# Patient Record
Sex: MALE | Race: WHITE | NOT HISPANIC OR LATINO | Employment: FULL TIME | URBAN - METROPOLITAN AREA
[De-identification: names, ages, dates, MRNs, and addresses within clinical notes are randomized per-mention and may not be internally consistent; named-entity substitution may affect disease eponyms.]

---

## 2017-05-01 ENCOUNTER — OFFICE VISIT (OUTPATIENT)
Dept: INTERNAL MEDICINE | Facility: CLINIC | Age: 27
End: 2017-05-01

## 2017-05-01 VITALS
BODY MASS INDEX: 19.85 KG/M2 | RESPIRATION RATE: 20 BRPM | DIASTOLIC BLOOD PRESSURE: 68 MMHG | WEIGHT: 140.2 LBS | HEART RATE: 74 BPM | SYSTOLIC BLOOD PRESSURE: 123 MMHG

## 2017-05-01 DIAGNOSIS — Z00.00 ROUTINE GENERAL MEDICAL EXAMINATION AT A HEALTH CARE FACILITY: Primary | ICD-10-CM

## 2017-05-01 DIAGNOSIS — Z71.9 HEALTH EDUCATION/COUNSELING: ICD-10-CM

## 2017-05-01 DIAGNOSIS — Z71.3 DIETARY COUNSELING AND SURVEILLANCE: ICD-10-CM

## 2017-05-01 ASSESSMENT — PAIN SCALES - GENERAL: PAINLEVEL: NO PAIN (0)

## 2017-05-01 NOTE — MR AVS SNAPSHOT
After Visit Summary   5/1/2017    Oziel Vann    MRN: [id number]           Patient Information     Date Of Birth          1990        Visit Information        Provider Department      5/1/2017 7:30 AM Dorota Hamm MD [facility]        Care Instructions    Primary Care Center Medication Refill Request Information:  * Please contact your pharmacy regarding ANY request for medication refills.  ** [facility] Prescription Fax = [phone]  * Please allow 3 business days for routine medication refills.  * Please allow 5 business days for controlled substance medication refills.     Primary Care Center Test Result notification information:  *You will be notified with in 7-10 days of your appointment day regarding the results of your test.  If you are on MyChart you will be notified as soon as the provider has reviewed the results and signed off on them.    Primary Care Center [phone]         Consider taking additional supplements for your health and bones:    Vitamin B12--or multivitamin   Calcium/D supplement    Calcium Rich Non-Dairy Foods:  Oranges  Bok Lucrecia  Watercress  Helene Greens  Brocolli  Kale  Edamame  Figs  [place]  Sardines  White Beans  Tofu  Almonds        Follow-ups after your visit        Follow-up notes from your care team     Return in about 1 year (around 5/1/2018), or if symptoms worsen or fail to improve, for Routine Visit.      Who to contact     Please call your clinic at [phone] to:    Ask questions about your health    Make or cancel appointments    Discuss your medicines    Learn about your test results    Speak to your doctor   If you have compliments or concerns about an experience at your clinic, or if you wish to file a complaint, please contact [facility] Patient Relations at [phone] or email us at [email or web address]         Additional Information About Your Visit        MyChart Information      Boom.fm is an electronic gateway that provides easy, online access to your medical records. With Boom.fm, you can request a clinic appointment, read your test results, renew a prescription or communicate with your care team.     To sign up for Boom.fm visit the website at [email or web address]   You will be asked to enter the access code listed below, as well as some personal information. Please follow the directions to create your username and password.     Your access code is: [email or web address]  Expires: 2017  8:06 AM     Your access code will  in 90 days. If you need help or a new code, please contact your [facility] or call [phone] for assistance.        Care EveryWhere ID     This is your Care EveryWhere ID. This could be used by other organizations to access your [place] medical records  [id number]        Your Vitals Were     Pulse Respirations BMI (Body Mass Index)             74 20 19.85 kg/m2          Blood Pressure from Last 3 Encounters:   17 123/68   16 120/68   16 123/74    Weight from Last 3 Encounters:   17 63.6 kg (140 lb 3.2 oz)   16 66.1 kg (145 lb 12.8 oz)   16 63.9 kg (140 lb 12.8 oz)              Today, you had the following     No orders found for display         Today's Medication Changes          These changes are accurate as of: 17  8:06 AM.  If you have any questions, ask your nurse or doctor.               Stop taking these medicines if you haven't already. Please contact your care team if you have questions.     azithromycin 250 MG tablet   Commonly known as:  ZITHROMAX   Stopped by:  Dorota Hamm MD           fexofenadine-pseudoePHEDrine 180-240 MG per 24 hr tablet   Commonly known as:  ALLEGRA-D 24   Stopped by:  Dorota Hamm MD           fluticasone 50 MCG/ACT spray   Commonly known as:  FLONASE   Stopped by:  Dorota Hamm MD           IBUPROFEN PO   Stopped by:   Dorota Hamm MD                    Primary Care Provider    None Specified       No primary provider on file.        Thank you!     Thank you for choosing [facility]  for your care. Our goal is always to provide you with excellent care. Hearing back from our patients is one way we can continue to improve our services. Please take a few minutes to complete the written survey that you may receive in the mail after your visit with us. Thank you!             Your Updated Medication List - Protect others around you: Learn how to safely use, store and throw away your medicines at www.disposemymeds.org.      Notice  As of 5/1/2017  8:06 AM    You have not been prescribed any medications.

## 2017-05-01 NOTE — NURSING NOTE
Chief Complaint   Patient presents with     Establish Care     patient would like to discuss vegan diet and health      YINA DANG at 7:24 AM on 5/1/2017.

## 2017-05-01 NOTE — PATIENT INSTRUCTIONS
[facility] Medication Refill Request Information:  * Please contact your pharmacy regarding ANY request for medication refills.  ** [facility] Prescription Fax = [phone]  * Please allow 3 business days for routine medication refills.  * Please allow 5 business days for controlled substance medication refills.     [facility] Test Result notification information:  *You will be notified with in 7-10 days of your appointment day regarding the results of your test.  If you are on MyChart you will be notified as soon as the provider has reviewed the results and signed off on them.    [facility] [phone]         Consider taking additional supplements for your health and bones:    Vitamin B12--or multivitamin   Calcium/D supplement    Calcium Rich Non-Dairy Foods:  Oranges  Bok Lucrecia  Watercress  Helene Greens  Brocolli  Kale  Edamame  Figs  [place]  Sardines  White Beans  Tofu  Almonds

## 2017-05-01 NOTE — PROGRESS NOTES
Mr. Vann is a 27 year old male here for physical.    History of Present Illness:  Oziel is a pleasant 28 yo  here for routine physical.  He is generally healthy without acute concerns.  He changed to vegetarian diet 1/1/17. Eats a little bit of fish and cheese.  No eggs.  He feels like energy may have been a bit lower during tax season, now improving.  He was training or a marathon and had a bit of hematuria which never recurred, once had hematochezia but this went away.  Was constipated at time.  No derm, neuro, psych, cardiovascular symptoms.    He does have a gym membership, bikes, runs, goes to [employer].    We reviewed the following health maintenance topics:  Immunizations  Cancer Screenings (Pap, mammogram, colonoscopy, prostate, skin, lung)  Lipids, STDs, HIV/Hep C screening if applicable  AAA screening, Dexa screening if applicable  Lifestyle factors/Saftey (diet, exercise, weight, stress, seat belts, sun protection)  Risk behaviors (tobacco, alcohol, illicit substances, abuse/violence, depression)  Advanced directive planning if applicable    A full 10-pt Review of Systems was performed, verified and is negative except as documented in the HPI.  All health questionnaires were reviewed, verified and relevant information documented above.    Past Medical History:  Past Medical History:   Diagnosis Date     Closed fracture of unspecified part of humerus 03/2005    Left distal Humerus fx.     MEDICAL HISTORY OF - 1995    Fracture of the distal right radius.     MEDICAL HISTORY OF - 1999    Tibia fracture of right leg       Past Surgical History:  Past Surgical History:   Procedure Laterality Date     SURGICAL HISTORY OF -   03/2005    ORIF Left Humerus Fx.  [facility]           Active Meds:  No current outpatient prescriptions on file.     No current facility-administered medications for this visit.         Allergies:  Cefaclor and Seasonal allergies    Family  History:  family history includes Coronary Artery Disease in his maternal grandfather. There is no history of DIABETES, Hypertension, Hyperlipidemia, Breast Cancer, Colon Cancer, Prostate Cancer, Thyroid Disease, Asthma, Genetic Disorder, Depression, or Anxiety Disorder.    Social History:  Social History   Substance Use Topics     Smoking status: Never Smoker     Smokeless tobacco: Never Used     Alcohol use Yes      Comment: 1-2 beers every 1-2 weeks       Physical Exam:  Vitals: /68  Pulse 74  Resp 20  Wt 63.6 kg (140 lb 3.2 oz)  BMI 19.85 kg/m2  Constitutional: Alert, oriented, pleasant, no acute distress, thin  Head: Normocephalic, atraumatic  Eyes: Extra-ocular movements intact, pupils equally round and reactive bilaterally, no scleral icterus  ENT: Oropharynx clear, moist mucus membranes, good dentition  Neck: Supple, no lymphadenopathy, no thyromegaly  Cardiovascular: Regular rate and rhythm, no murmurs, rubs or gallops, peripheral pulses full/symmetric  Respiratory: Good air movement bilaterally, lungs clear, no wheezes/rales/rhonchi  GI: Abdomen soft, bowel sounds present, nondistended, nontender, no organomegaly or masses, no rebound/guarding  Musculoskeletal: No edema, normal muscle tone, normal gait  Neurologic: Alert and oriented, cranial nerves 2-12 intact, nonfocal  Skin: No rashes/lesions, fair skin, no concerning lesions  Psychiatric: normal mentation, affect and mood          Assessment and Plan:    Oziel was seen today for establish care.  Reviewed mood, substance use, sunscreen, safety, exercise, weight, nutrition precautions.  No high risk behaviors.  Follows healthy diet.  Given previous fractures and diet towards veganism, rec considering Ca/D supplement, as well as B12/mvit supplement.  No need for labs today.  All questions/concerns addressed.    Diagnoses and all orders for this visit:    Routine general medical examination at a health care facility    Dietary counseling and  surveillance    Health education/counseling          #Routine Health Maintenence:  Immunizations (zoster, pneumovax, flu, Tdap, Hep A/B):   Most Recent Immunizations   Administered Date(s) Administered     DPT 07/16/1990     DTAP (<7y) 07/18/1994     Hepatitis B 09/30/2002     Influenza (IIV3) 01/07/2011     MMR 08/02/2001     Meningococcal (Menactra ) 05/16/2007     OPV 07/18/1994     TD (ADULT, 7+) 05/12/2003     TDAP Vaccine (Adacel) 07/12/2010     Lipids:   Recent Labs   Lab Test  11/28/14   1048   CHOL  144   HDL  92   LDL  44   TRIG  40   CHOLHDLRATIO  1.6     Lung Ca Screening (>30 pk age 55-79 or >20 py age 50-79 + RF): n/a  Colonoscopy (50-75 yrs): n/a  Dexa (>65W or 70M yrs): n/a  GC/Chlam (<25 yrs): not sexually active, declines  HIV/HCV if risk factors: n/a  Safety/Lifestyle: reviewed  Tob/EtOH: not high risk  Depression: screen neg  Advanced Directive: n/a    Return to clinic:  1 year or prn    Dorota Hamm MD  Internal Medicine

## 2017-07-08 ENCOUNTER — TRANSFERRED RECORDS (OUTPATIENT)
Dept: HEALTH INFORMATION MANAGEMENT | Facility: CLINIC | Age: 27
End: 2017-07-08

## 2017-07-10 ENCOUNTER — OFFICE VISIT (OUTPATIENT)
Dept: FAMILY MEDICINE | Facility: CLINIC | Age: 27
End: 2017-07-10
Payer: COMMERCIAL

## 2017-07-10 VITALS
OXYGEN SATURATION: 98 % | TEMPERATURE: 97.1 F | BODY MASS INDEX: 19.46 KG/M2 | WEIGHT: 139 LBS | DIASTOLIC BLOOD PRESSURE: 76 MMHG | HEIGHT: 71 IN | SYSTOLIC BLOOD PRESSURE: 136 MMHG | HEART RATE: 80 BPM

## 2017-07-10 DIAGNOSIS — T23.201A PARTIAL THICKNESS BURN OF RIGHT HAND, UNSPECIFIED SITE OF HAND, INITIAL ENCOUNTER: Primary | ICD-10-CM

## 2017-07-10 PROCEDURE — 99213 OFFICE O/P EST LOW 20 MIN: CPT | Performed by: NURSE PRACTITIONER

## 2017-07-10 RX ORDER — SULFAMETHOXAZOLE/TRIMETHOPRIM 800-160 MG
1 TABLET ORAL 2 TIMES DAILY
COMMUNITY
End: 2019-01-15

## 2017-07-10 RX ORDER — SILVER SULFADIAZINE 10 MG/G
CREAM TOPICAL 2 TIMES DAILY
COMMUNITY
End: 2019-01-15
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
## 2017-07-10 NOTE — NURSING NOTE
"Chief Complaint   Patient presents with     Er F/u       Initial /76 (BP Location: Right arm, Patient Position: Chair, Cuff Size: Adult Regular)  Pulse 80  Temp 97.1  F (36.2  C) (Oral)  Ht 5' 10.75\" (1.797 m)  Wt 139 lb (63 kg)  SpO2 98%  BMI 19.52 kg/m2 Estimated body mass index is 19.52 kg/(m^2) as calculated from the following:    Height as of this encounter: 5' 10.75\" (1.797 m).    Weight as of this encounter: 139 lb (63 kg).  Medication Reconciliation: complete    "

## 2017-07-10 NOTE — PATIENT INSTRUCTIONS
[facility]    If you have any questions regarding to your visit please contact your care team:       Team Red:   Clinic Hours Telephone Number   Dr. Nancy Boykin, NP   7am-7pm  Monday - Thursday   7am-5pm  Fridays  [phone]  (Appointment scheduling available 24/7)    Questions about your visit?   Team Line  [phone]   Urgent Care - [place] - 11am-9pm Monday-Friday Saturday-Sunday- 9am-5pm   [place] - 5pm-9pm Monday-Friday Saturday-Sunday- 9am-5pm  [phone] - [place]   [phone] - [place]       What options do I have for visits at the clinic other than the traditional office visit?  To expand how we care for you, many of our providers are utilizing electronic visits (e-visits) and telephone visits, when medically appropriate, for interactions with their patients rather than a visit in the clinic.   We also offer nurse visits for many medical concerns. Just like any other service, we will bill your insurance company for this type of visit based on time spent on the phone with your provider. Not all insurance companies cover these visits. Please check with your medical insurance if this type of visit is covered. You will be responsible for any charges that are not paid by your insurance.      E-visits via Tawkers:  generally incur a $35.00 fee.  Telephone visits:  Time spent on the phone: *charged based on time that is spent on the phone in increments of 10 minutes. Estimated cost:   5-10 mins $30.00   11-20 mins. $59.00   21-30 mins. $85.00     Use [employer] (secure email communication and access to your chart) to send your primary care provider a message or make an appointment. Ask someone on your Team how to sign up for Tawkers.  For a Price Quote for your services, please call our Consumer Price Line at [phone].      As always, Thank you for trusting us with your health care needs!  Wendy FIELDS  MA

## 2017-07-12 ENCOUNTER — OFFICE VISIT (OUTPATIENT)
Dept: WOUND CARE | Facility: CLINIC | Age: 27
End: 2017-07-12

## 2017-07-12 DIAGNOSIS — T30.0 BURN: Primary | ICD-10-CM

## 2017-07-12 NOTE — MR AVS SNAPSHOT
After Visit Summary   2017    Oziel Vann    MRN: [id number]           Patient Information     Date Of Birth          1990        Visit Information        Provider Department      2017 8:30 AM Curtis Campbell RN [facility]        Today's Diagnoses     Burn    -  1       Follow-ups after your visit        Who to contact     Please call your clinic at [phone] to:    Ask questions about your health    Make or cancel appointments    Discuss your medicines    Learn about your test results    Speak to your doctor   If you have compliments or concerns about an experience at your clinic, or if you wish to file a complaint, please contact [facility] Patient Relations at [phone] or email us at [email or web address]         Additional Information About Your Visit        MyChart Information     Chooos is an electronic gateway that provides easy, online access to your medical records. With Chooos, you can request a clinic appointment, read your test results, renew a prescription or communicate with your care team.     To sign up for Chooos visit the website at [email or web address]   You will be asked to enter the access code listed below, as well as some personal information. Please follow the directions to create your username and password.     Your access code is: [email or web address]  Expires: 2017  8:06 AM     Your access code will  in 90 days. If you need help or a new code, please contact your [facility] or call [phone] for assistance.        Care EveryWhere ID     This is your Care EveryWhere ID. This could be used by other organizations to access your [place] medical records  [id number]         Blood Pressure from Last 3 Encounters:   07/10/17 136/76   17 123/68   16 120/68    Weight from Last 3 Encounters:   07/10/17 63 kg (139 lb)   17 63.6 kg (140 lb 3.2 oz)    08/05/16 66.1 kg (145 lb 12.8 oz)              Today, you had the following     No orders found for display       Primary Care Provider    None Specified       No primary provider on file.        Equal Access to Services     CAROL ROBERTSON : Hadii aad ku hadmarcelajennifer Davis, belkis justynamichelaha, kenneth kadalia aguilera, agueda fabianin hayaalance barriosdai rodriguez laCelesteilsa guerrero. So [facility] [phone].    ATENCIÓN: Si habla español, tiene a fraser disposición servicios gratuitos de asistencia lingüística. Llame al [phone].    We comply with applicable federal civil rights laws and [place] laws. We do not discriminate on the basis of race, color, national origin, age, disability sex, sexual orientation or gender identity.            Thank you!     Thank you for choosing [facility] WOUND OSTOMY  for your care. Our goal is always to provide you with excellent care. Hearing back from our patients is one way we can continue to improve our services. Please take a few minutes to complete the written survey that you may receive in the mail after your visit with us. Thank you!             Your Updated Medication List - Protect others around you: Learn how to safely use, store and throw away your medicines at www.disposemymeds.org.          This list is accurate as of: 7/12/17 10:54 AM.  Always use your most recent med list.                   Brand Name Dispense Instructions for use Diagnosis    silver sulfADIAZINE 1 % cream    SILVADENE     Apply topically 2 times daily        sulfamethoxazole-trimethoprim 800-160 MG per tablet    BACTRIM DS/SEPTRA DS     Take 1 tablet by mouth 2 times daily 14 tablets
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
## 2018-07-05 ENCOUNTER — OFFICE VISIT (OUTPATIENT)
Dept: ORTHOPEDICS | Facility: CLINIC | Age: 28
End: 2018-07-05
Payer: COMMERCIAL

## 2018-07-05 ENCOUNTER — RADIANT APPOINTMENT (OUTPATIENT)
Dept: GENERAL RADIOLOGY | Facility: CLINIC | Age: 28
End: 2018-07-05
Attending: FAMILY MEDICINE
Payer: COMMERCIAL

## 2018-07-05 VITALS
WEIGHT: 139 LBS | DIASTOLIC BLOOD PRESSURE: 82 MMHG | HEIGHT: 71 IN | BODY MASS INDEX: 19.46 KG/M2 | SYSTOLIC BLOOD PRESSURE: 137 MMHG

## 2018-07-05 DIAGNOSIS — M25.532 ACUTE PAIN OF LEFT WRIST: Primary | ICD-10-CM

## 2018-07-05 DIAGNOSIS — M25.532 ACUTE PAIN OF LEFT WRIST: ICD-10-CM

## 2018-07-05 DIAGNOSIS — M76.61 RIGHT ACHILLES TENDINITIS: ICD-10-CM

## 2018-07-05 NOTE — MR AVS SNAPSHOT
"              After Visit Summary   2018    Oziel Vann    MRN: [id number]           Patient Information     Date Of Birth          1990        Visit Information        Provider Department      2018 1:20 PM Curtis Thorpe DO [facility]        Today's Diagnoses     Acute pain of left wrist    -  1    Right Achilles tendinitis           Follow-ups after your visit        Who to contact     Please call your clinic at [phone] to:    Ask questions about your health    Make or cancel appointments    Discuss your medicines    Learn about your test results    Speak to your doctor            Additional Information About Your Visit        MyChart Information     Mozaico is an electronic gateway that provides easy, online access to your medical records. With Mozaico, you can request a clinic appointment, read your test results, renew a prescription or communicate with your care team.     To sign up for Mozaico visit the website at [email or web address]   You will be asked to enter the access code listed below, as well as some personal information. Please follow the directions to create your username and password.     Your access code is: [email or web address]  Expires: 10/7/2018 10:10 PM     Your access code will  in 90 days. If you need help or a new code, please contact your [facility] or call [phone] for assistance.        Care EveryWhere ID     This is your Care EveryWhere ID. This could be used by other organizations to access your [place] medical records  [id number]        Your Vitals Were     Height BMI (Body Mass Index)                1.797 m (5' 10.75\") 19.52 kg/m2           Blood Pressure from Last 3 Encounters:   18 137/82   07/10/17 136/76   17 123/68    Weight from Last 3 Encounters:   18 63 kg (139 lb)   07/10/17 63 kg (139 lb)   17 63.6 kg (140 lb 3.2 oz)               Primary Care Provider "    None Specified       No primary provider on file.        Equal Access to Services     CAROL ROBERTSON : Hadii aad ku hadmarcelajennifer Davis, wamanuelitojarad gaytan, polloagueda mai. So [facility] [phone].    ATENCIÓN: Si habla español, tiene a fraser disposición servicios gratuitos de asistencia lingüística. Llame al [phone].    We comply with applicable federal civil rights laws and [place] laws. We do not discriminate on the basis of race, color, national origin, age, disability, sex, sexual orientation, or gender identity.            Thank you!     Thank you for choosing [facility] SPORTS AND ORTHOPAEDIC WALK IN CLINIC  for your care. Our goal is always to provide you with excellent care. Hearing back from our patients is one way we can continue to improve our services. Please take a few minutes to complete the written survey that you may receive in the mail after your visit with us. Thank you!             Your Updated Medication List - Protect others around you: Learn how to safely use, store and throw away your medicines at www.disposemymeds.org.          This list is accurate as of 7/5/18 11:59 PM.  Always use your most recent med list.                   Brand Name Dispense Instructions for use Diagnosis    silver sulfADIAZINE 1 % cream    SILVADENE     Apply topically 2 times daily        sulfamethoxazole-trimethoprim 800-160 MG per tablet    BACTRIM DS/SEPTRA DS     Take 1 tablet by mouth 2 times daily 14 tablets
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
## 2018-07-09 PROBLEM — M76.61 RIGHT ACHILLES TENDINITIS: Status: ACTIVE | Noted: 2018-07-09
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
## 2018-12-29 ENCOUNTER — NURSE TRIAGE (OUTPATIENT)
Dept: NURSING | Facility: CLINIC | Age: 28
End: 2018-12-29

## 2018-12-29 NOTE — TELEPHONE ENCOUNTER
"Travelling to [place] next month and inquiring if he has had the recommended vaccines.  Reviewed patient's vaccine history. Advised [facility] with [facility].  Information given for [facility].    In addition, patient states that he is has a \"deep sore throat for the past day or so.\"  It is painful to swallow, rates the pain as 3-4/10.  The pain is getting a little worse over the past day.    Fatigue  No fever or cough     Protocol and care advice reviewed  Caller states understanding of the recommended home care.    Advised to call back if further questions or concerns      Additional Information    Negative: Severe difficulty breathing (e.g., struggling for each breath, speaks in single words, stridor)    Negative: Sounds like a life-threatening emergency to the triager    Negative: [1] Drooling or spitting out saliva (because can't swallow) AND [2] normal breathing    Negative: Unable to open mouth completely    Negative: [1] Difficulty breathing AND [2] not severe    Negative: Fever > 104 F (40 C)    Negative: [1] Refuses to drink anything AND [2] for > 12 hours    Negative: [1] Drinking very little AND [2] dehydration suspected (e.g., no urine > 12 hours, very dry mouth, very lightheaded)    Negative: Patient sounds very sick or weak to the triager    Negative: SEVERE (e.g., excruciating) throat pain    Negative: [1] Pus on tonsils (back of throat) AND [2]  fever AND [3] swollen neck lymph nodes (\"glands\")    Negative: [1] Rash AND [2] widespread (especially chest and abdomen)    Negative: Earache also present    Negative: Fever present > 3 days (72 hours)    Negative: Diabetes mellitus or weak immune system (e.g., HIV positive, cancer chemo, splenectomy, organ transplant)    Negative: History of rheumatic fever    Negative: [1] Adult is leaving on a trip AND [2] requests an antibiotic NOW    Negative: [1] Positive throat culture or rapid strep test (according to lab, " PCP, caller, etc.) AND [2] NO  standing order to call in prescription for antibiotic    Negative: [1] Exposure to family member (or spouse or boyfriend/girlfriend) with test-proven strep AND [2] within last 10 days    Negative: [1] Sore throat is the only symptom AND [2] present > 48 hours    Negative: [1] Sore throat with cough/cold symptoms AND [2] present > 5 days    [1] Sore throat is the only symptom AND [2] sore throat present < 48 hours  (all triage questions negative)    Protocols used: SORE THROAT-ADULT-

## 2019-01-15 ENCOUNTER — OFFICE VISIT (OUTPATIENT)
Dept: FAMILY MEDICINE | Facility: CLINIC | Age: 29
End: 2019-01-15
Payer: COMMERCIAL

## 2019-01-15 VITALS
TEMPERATURE: 97.5 F | BODY MASS INDEX: 19.52 KG/M2 | SYSTOLIC BLOOD PRESSURE: 108 MMHG | DIASTOLIC BLOOD PRESSURE: 68 MMHG | HEIGHT: 71 IN

## 2019-01-15 DIAGNOSIS — Z71.84 COUNSELING FOR TRAVEL: Primary | ICD-10-CM

## 2019-01-15 DIAGNOSIS — Z23 NEED FOR VACCINATION: ICD-10-CM

## 2019-01-15 PROCEDURE — 90471 IMMUNIZATION ADMIN: CPT | Mod: GA | Performed by: FAMILY MEDICINE

## 2019-01-15 PROCEDURE — 90472 IMMUNIZATION ADMIN EACH ADD: CPT | Mod: GA | Performed by: FAMILY MEDICINE

## 2019-01-15 PROCEDURE — 90691 TYPHOID VACCINE IM: CPT | Mod: GA | Performed by: FAMILY MEDICINE

## 2019-01-15 PROCEDURE — 90717 YELLOW FEVER VACCINE SUBQ: CPT | Mod: GA | Performed by: FAMILY MEDICINE

## 2019-01-15 PROCEDURE — 90632 HEPA VACCINE ADULT IM: CPT | Mod: GA | Performed by: FAMILY MEDICINE

## 2019-01-15 PROCEDURE — 99402 PREV MED CNSL INDIV APPRX 30: CPT | Mod: 25 | Performed by: FAMILY MEDICINE

## 2019-01-15 RX ORDER — AZITHROMYCIN 500 MG/1
TABLET, FILM COATED ORAL
Qty: 3 TABLET | Refills: 0 | Status: SHIPPED | OUTPATIENT
Start: 2019-01-15 | End: 2022-12-19

## 2019-01-15 NOTE — PROGRESS NOTES
Itinerary:  [place]    Departure Date: 01/18/2019    Return Date: 02/02/2019    Length of Trip: 2.5 weeks    Purpose of Trip: pleasure    Urban/Rural: both    Accommodations: hotel, friends    IMMUNIZATION HISTORY  Have you received any immunizations within the past 4 weeks?  No  Have you ever fainted from having your blood drawn or from an injection?  No  Have you ever had a fever reaction to vaccination?  No  Have you ever had any bad reaction or side effect from any vaccination?  No  Have you ever had hepatitis A or B vaccine?  Yes  Do you live (or work closely) with anyone who has AIDS, an AIDS-like condition, any other immune disorder or who is on chemotherapy for cancer or a   family history of immunodeficiency?  No  Have you received any injection of immune globulin or any blood products during the past 12 months?  No    Patient roomed by Jaimie Delgado CMA    Date: Barrett 15, 2019  Consent Version Date:   Consent Obtained by:  Dex Mark  HIPAA:  Yes  HIPAA Authorization Signed Date: Barrett 15, 2019        Inclusion/Exclusion Criteria:    (Similar to Yellow Fever-VAX)      The patient met all of the following inclusion criteria in order to be eligible for the Stamaril vaccination under this EAP (Expanded Access Investigational New Drug Program)           At increased risk for YF, including researchers, laboratory workers, vaccine production staff, and those who are traveling within 30 days to a YF-endemic region or to a country requiring proof of YF vaccination under IHRs (International Health Regulations)?       Yes     Patient is greater than or equal to 9 months of age on the day of vaccination?     Yes     Patient is greater than or equal to 18 years of age and signed and dated the Consent Forms?     Yes     Patient is < 18 years of age and parent(s)/guardian(s) signed and dated the Consent Forms?      Patient is 7 years to < 18 years of age and signed and dated the Assent form?        No Assent is  required.  Patient is <7 years of age.     No      No      N/A     The patient did not meet any of the following criteria that would have excluded the patient from receiving the Stamaril vaccination under this EAP              Patient is less than 9 months of age.       No     The patient is breast-feeding and cannot stop nursing for at least 14 days after vaccination.    Note: Yellow Fever vaccine virus may be transmissible via breast milk by nursing mothers who are vaccinated during the final 2 weeks of pregnancy or post-partum.   Following transmission, infants may develop encephalitis.  The minimum time of discontinuation of breastfeeding for 14 days after vaccination is based on the expected clearance of live-attenuated vaccine virus.       No     The patient is immunosuppressed, whether congenital or idiopathic, including for example, leukemia, lymphoma, other malignancies, and patients who are receiving immunosuppressant medications (e.g. Systemic corticosteroids [greater than the standard dose of topical or inhaled steroids], alkylating drugs, antimetabolites, of other cytotoxic or immunomodulatory drugs) or radiation therapy or organ transplantation.       No     The patient has known hypersensitivity to the active substance or to any of the excipients of Stamaril vaccine or to eggs or chicken proteins.     No     The patient is symptomatic for human immunodeficiency virus (HIV) infection     No     The patient is asymptomatic for HIV infection but accompanied by evidence of severe immune suppression    Note:  Evidence of severe immune suppression includes CD4+ T-cell counts < 200 cubic millimeters (or < 15% total lymphocytes in children aged < 6 years), or as determined by the health care provider.       No     The patient has a history of thymus dysfunction (including myasthenia gravis, thymoma, thymectomy)     No     Moderate or severe febrile illness or acute illness    Note: Participation in the EA  "can be reassessed when moderate or severe febrile illness or acute illness has resolved.       No         Special medical concerns: none    /68   Temp 97.5  F (36.4  C) (Tympanic)   Ht 1.797 m (5' 10.75\")   BMI 19.52 kg/m    EXAM: deferred    Immunizations discussed include: Hepatitis A, Typhoid and Yellow Fever  Malaraia prophylaxis recommended: none, will not be in high risk areas, mosquito precautions only  Symptomatic treatment for traveler's diarrhea: azithromycin    ASSESSMENT/PLAN:    ICD-10-CM    1. Counseling for travel Z71.89 YELLOW FEVER IMMUNIZATN,LIVE,SUBCUT     TYPHOID VACCINE, IM     azithromycin (ZITHROMAX) 500 MG tablet     HEPA VACCINE ADULT IM   2. Need for vaccination Z23 YELLOW FEVER IMMUNIZATN,LIVE,SUBCUT     TYPHOID VACCINE, IM     azithromycin (ZITHROMAX) 500 MG tablet     HEPA VACCINE ADULT IM     I have reviewed general recommendations for safe travel   including: food/water precautions, insect avoidance, safe sex   practices given high prevalence of HIV and other STDs,   roadway safety. Educational materials and [facility] report provided.    Total visit time 30 minutes with over 50% of time spent counseling patient.    "

## 2022-12-19 ENCOUNTER — APPOINTMENT (OUTPATIENT)
Dept: LAB | Facility: CLINIC | Age: 32
End: 2022-12-19
Payer: COMMERCIAL

## 2022-12-19 ENCOUNTER — OFFICE VISIT (OUTPATIENT)
Dept: FAMILY MEDICINE | Facility: CLINIC | Age: 32
End: 2022-12-19
Payer: COMMERCIAL

## 2022-12-19 VITALS
SYSTOLIC BLOOD PRESSURE: 132 MMHG | HEIGHT: 72 IN | DIASTOLIC BLOOD PRESSURE: 84 MMHG | OXYGEN SATURATION: 98 % | BODY MASS INDEX: 23.7 KG/M2 | TEMPERATURE: 97.2 F | HEART RATE: 78 BPM | RESPIRATION RATE: 16 BRPM | WEIGHT: 175 LBS

## 2022-12-19 DIAGNOSIS — Z11.4 SCREENING FOR HIV (HUMAN IMMUNODEFICIENCY VIRUS): ICD-10-CM

## 2022-12-19 DIAGNOSIS — Z12.83 SKIN CANCER SCREENING: ICD-10-CM

## 2022-12-19 DIAGNOSIS — Z11.3 ROUTINE SCREENING FOR STI (SEXUALLY TRANSMITTED INFECTION): ICD-10-CM

## 2022-12-19 DIAGNOSIS — Z00.00 ANNUAL PHYSICAL EXAM: Primary | ICD-10-CM

## 2022-12-19 DIAGNOSIS — Z11.59 NEED FOR HEPATITIS C SCREENING TEST: ICD-10-CM

## 2022-12-19 DIAGNOSIS — Z78.9 VEGETARIAN: ICD-10-CM

## 2022-12-19 PROBLEM — M76.61 RIGHT ACHILLES TENDINITIS: Status: RESOLVED | Noted: 2018-07-09 | Resolved: 2022-12-19

## 2022-12-19 LAB
HCV AB SERPL QL IA: NONREACTIVE
HIV 1+2 AB+HIV1 P24 AG SERPL QL IA: NONREACTIVE

## 2022-12-19 PROCEDURE — 87591 N.GONORRHOEAE DNA AMP PROB: CPT | Performed by: FAMILY MEDICINE

## 2022-12-19 PROCEDURE — 36415 COLL VENOUS BLD VENIPUNCTURE: CPT | Performed by: FAMILY MEDICINE

## 2022-12-19 PROCEDURE — 87491 CHLMYD TRACH DNA AMP PROBE: CPT | Performed by: FAMILY MEDICINE

## 2022-12-19 PROCEDURE — 86803 HEPATITIS C AB TEST: CPT | Performed by: FAMILY MEDICINE

## 2022-12-19 PROCEDURE — 86780 TREPONEMA PALLIDUM: CPT | Performed by: FAMILY MEDICINE

## 2022-12-19 PROCEDURE — 99385 PREV VISIT NEW AGE 18-39: CPT | Performed by: FAMILY MEDICINE

## 2022-12-19 PROCEDURE — 87389 HIV-1 AG W/HIV-1&-2 AB AG IA: CPT | Performed by: FAMILY MEDICINE

## 2022-12-19 ASSESSMENT — ENCOUNTER SYMPTOMS
FEVER: 0
NAUSEA: 0
DYSURIA: 0
SORE THROAT: 0
EYE PAIN: 0
MYALGIAS: 0
ARTHRALGIAS: 0
HEMATOCHEZIA: 0
HEARTBURN: 0
DIZZINESS: 0
HEADACHES: 0
PARESTHESIAS: 0
CHILLS: 0
HEMATURIA: 0
JOINT SWELLING: 0
CONSTIPATION: 0
NERVOUS/ANXIOUS: 0
PALPITATIONS: 0
FREQUENCY: 0
ABDOMINAL PAIN: 0
DIARRHEA: 0
COUGH: 0
WEAKNESS: 0
SHORTNESS OF BREATH: 0

## 2022-12-19 ASSESSMENT — PAIN SCALES - GENERAL: PAINLEVEL: NO PAIN (0)

## 2022-12-19 NOTE — PROGRESS NOTES
"Assessment/Plan    Preventive. Immunizations and/or screening tests as noted below. To schedule COVID booster around 2/15/23. Pt will work on increased exercise.    Right axillary lesion. C/w inflamed nevus. Observe.    Fair complexion. Denies personal hx or FH of skin cancer. Pt interested in skin cancer screening with dermatology. I think that is reasonable. Referral placed.    Vegetarian. Appears that pt has a relatively balanced diet. He is interested in meeting with Nutrition, so referral was placed. Encouraged pt to check insurance coverage prior to scheduling.    F/u annually or sooner if needed.    ----  Chief complaint: Physical    Patient has been advised of split billing requirements and indicates understanding: Yes    Social History     Social History Narrative    Grew up in [place], [place]. Lives alone. Works as  at accounting firm.       Denies FH prostate or colorectal cancer.    Female partners. Denies STI hx. Denies recent STI symptoms.    Exam  /84 (BP Location: Right arm, Patient Position: Sitting, Cuff Size: Adult Regular)   Pulse 78   Temp 97.2  F (36.2  C) (Temporal)   Resp 16   Ht 1.834 m (6' 0.21\")   Wt 79.4 kg (175 lb)   SpO2 98%   BMI 23.60 kg/m    oropharynx without abnormal masses  no cervical adenopathy  lung fields CTAB  heart with regular rate and rhythm, no murmurs  no lower leg edema  Brown pedunculated lesion in right axillary area, about 6mm in diameter    ----    Orders and associated diagnoses for billing purposes:  Oziel was seen today for physical.    Diagnoses and all orders for this visit:    Annual physical exam    Screening for HIV (human immunodeficiency virus)  -     HIV Antigen Antibody Combo; Future  -     HIV Antigen Antibody Combo    Need for hepatitis C screening test  -     Hepatitis C Screen Reflex to HCV RNA Quant and Genotype; Future  -     Hepatitis C Screen Reflex to HCV RNA Quant and Genotype    Vegetarian  -     Nutrition Referral; " Future    Routine screening for STI (sexually transmitted infection)  -     NEISSERIA GONORRHOEA PCR; Future  -     CHLAMYDIA TRACHOMATIS PCR; Future  -     Treponema Abs w Reflex to RPR and Titer; Future  -     NEISSERIA GONORRHOEA PCR  -     CHLAMYDIA TRACHOMATIS PCR  -     Treponema Abs w Reflex to RPR and Titer    Skin cancer screening  -     Adult Dermatology Referral; Future    Other orders  -     REVIEW OF HEALTH MAINTENANCE PROTOCOL ORDERS

## 2022-12-19 NOTE — PATIENT INSTRUCTIONS
Call to schedule with nutrition - [phone].    Dermatologist will call you.    Schedule COVID vaccine around middle of February.

## 2022-12-20 LAB
C TRACH DNA SPEC QL NAA+PROBE: NEGATIVE
N GONORRHOEA DNA SPEC QL NAA+PROBE: NEGATIVE
T PALLIDUM AB SER QL: NONREACTIVE

## 2023-01-27 ENCOUNTER — VIRTUAL VISIT (OUTPATIENT)
Dept: ONCOLOGY | Facility: CLINIC | Age: 33
End: 2023-01-27
Attending: FAMILY MEDICINE
Payer: COMMERCIAL

## 2023-01-27 DIAGNOSIS — Z78.9 VEGETARIAN: ICD-10-CM

## 2023-01-27 PROCEDURE — 97802 MEDICAL NUTRITION INDIV IN: CPT | Performed by: DIETITIAN, REGISTERED

## 2023-01-27 NOTE — PATIENT INSTRUCTIONS
Flaco Ludwig,     I have attached the resources that I referred (see your email):  --Sources of protein (vegan)  --Sources of protein  --Fall Thrive series    Here are your nutrition goals:  --Calories: 2400/day  --Protein: 75-90 grams/day  --Fiber: 25 grams/day  --Vitamins/minerals: Vitamin D 1000IU/day (I sent a message to Dr. Britton for a vitamin D lab order)      Here is the web link to the Thrive series on Mediterranean diet:  THRIVE Cancer Survivorship Class Series  Cancer Survivorship Program ([facility])  THRIVE Cancer Survivorship Class Series  Cancer Survivorship Program - [facility]  What is the Thrive Series? The Thrive Series is a free, weekly virtual series that provides those interested in learning more about cancer survivorship with education and resources on a variety of topics straight from local experts. Choose to register for the sessions you'd like to attend live online. Sessions will be recorded so you can watch again or catch up on a session you missed.  survivorship.[facility]  ?  Please reach out with any questions along the way!    Be well,     Patti Albert RD, , LD  [facility]  Outpatient Services  [email or web address]   Office: [phone]  Fax: [phone]

## 2023-01-27 NOTE — PROGRESS NOTES
Video-Visit Details     Type of service:  Video Visit     Video Start Time (time video started): 12:55pm     Video End Time (time video stopped): 1:56pm    Originating Location (pt. Location): Home     Distant Location (provider location):  [facility] NUTRITION SERVICES      Mode of Communication:  Video Conference via [facility]      CLINICAL NUTRITION SERVICES - ASSESSMENT NOTE    Oziel Vann 33 year old referred for MNT related to general nutrition, vegetarian     Time Spent: 60 minutes  Visit Type: video  Pt accompanied by: Dietetic Intern, Chino Calle  Referring Physician: Dr. Britton 12/19/22  Z78.9 (ICD-10-CM) - Vegetarian  Additional Information:  generalized diet counseling, vegetarian    Oziel has been working at increasing his activity. He has been working out ~5 days/week.  Activity: 3 days/week at the gym - weights/cardio (60-90 minutes)   Activity: 2 days/week at home - strength/core (30 minutes)     NUTRITION HISTORY  Current diet/appetite: Vegetarian for the past 5 years. He eats pescatarian, cheese and eggs  Oziel presents today with desire to review diet and obtain ideas on how to optimize current food choices and to ensure adequacy.     Diet recall:  Mon, 1/23 Bfast - coffee, cereal (special K) with blueberries   Lunch - tuna sandwich (bonds/ mustard/ lettuce/ celery/ onion/ pickle) with chips and apple   Dinner - Noemy s salomon (spinach, sweet potatoes) with rice, naan and grapes   Drink - 1.5 glasses of wine     Tues, 1/24 Bfast - [place] coffee and bagel with cream cheese   Lunch - green +grain wrap and chips   Dinner - four slices of pizza (Jakeenos) and one slice of cheese bread   Dessert - small cup of ice cream (s. Joes)   Drinks - two beers     Wed, 1/25   Bfast - two cups of coffee, bfast bar; dried fruit slice from work   Lunch - falafel gyro w/ side salad   Dinner - veggie Asian rice bowl w/ side salad and toast   Drinks - coconut water     Thurs, 1/26   Bfast - yogurt  "with granola, avocado toast, espresso, one cup coffee  Lunch- half veggie sandwich with cup of broccoli cheese soap and ice tea   Dinner - mc deng half veggie sub with chips and chocolate bar   Drinks - one beer     Fri, 1/27   Bfast - banana, fruit bowl, eggs, hasbrowns, veggies    Oziel is interested in gaining muscle mass.    He is able to drink more water when he goes to the gym.   ANTHROPOMETRICS  Height: 6'0\"  Weight: 175 lbs/79kg  BMI: 23  Weight Status:  Normal BMI  IBW: 178 lbs (98%)  Weight History:   Wt Readings from Last 8 Encounters:   12/19/22 79.4 kg (175 lb)   07/05/18 63 kg (139 lb)   07/10/17 63 kg (139 lb)   05/01/17 63.6 kg (140 lb 3.2 oz)   08/05/16 66.1 kg (145 lb 12.8 oz)   06/02/16 63.9 kg (140 lb 12.8 oz)   05/27/15 65.3 kg (144 lb)   11/28/14 67.6 kg (149 lb)     Dosing Weight: 79kg    Medications/vitamins/minerals/herbals:   Reviewed    Labs:   Labs reviewed    ASSESSED NUTRITION NEEDS:  Estimated Energy Needs: 2400 kcals (25-30 Kcal/Kg)  Justification: maintenance  Estimated Protein Needs: 75-90 grams protein (1-1.2 g pro/Kg)  Justification: maintenance    NUTRITION DIAGNOSIS:  No nutrition diagnosis identified at this time     INTERVENTIONS  Provided written & verbal education:     - Reviewed nutrition and hydration goals.  Advised pt to aim for at least 2400kcal and 75-90g protein daily.    Advised pt to aim for 8 cups non-caffeine containing beverages (water/electrolytes) daily.    - Reviewed sources of protein (animal and plant based). Reviewed ways to complete a protein if having a vegan meal.  Discussed ways to combine plant based proteins with grains.   - Reviewed and encouraged Mediterranean diet guidelines.    - Reviewed vitamin/minerals.  Discussed vitamin D and vitamin D lab draw.  Oziel consumes little dairy, has fair skin and doesn't spend much time in the sun.  RD sent message to MD for vitamin D lab draw.   - Provided praise for healthful food choices.  Provided ideas " on batch cooking and meal planning.    Provided pt with corresponding education materials/handouts on:  --Sources of protein (vegan)  --Sources of protein  --Fall Thrive series (batch cooked recipe ideas)    Pt verbalize understanding of materials provided during consult.   Patient Understanding: good  Expected patient engagement: good     Goals  --Calories: 2400/day  --Protein: 75-90 grams/day  --Fiber: 25 grams/day  --Vitamins/minerals: Vitamin D 1000IU/day    Follow-Up Plans: Pt has RD contact information for questions.      MONITORING AND EVALUATION:  Patti Albert RD, , LD

## 2023-01-27 NOTE — LETTER
1/27/2023         RE: Oziel Vann  [address] [facility] [address]        Dear Colleague,    Thank you for referring your patient, Oziel Vann, to the [facility]. Please see a copy of my visit note below.    Video-Visit Details     Type of service:  Video Visit     Video Start Time (time video started): 12:55pm     Video End Time (time video stopped): 1:56pm    Originating Location (pt. Location): Home     Distant Location (provider location):  [facility] NUTRITION SERVICES      Mode of Communication:  Video Conference via [facility] NUTRITION SERVICES - ASSESSMENT NOTE    Oziel Vann 33 year old referred for MNT related to general nutrition, vegetarian     Time Spent: 60 minutes  Visit Type: video  Pt accompanied by: Dietetic Intern, Chino Calle  Referring Physician: Dr. Britton 12/19/22  Z78.9 (ICD-10-CM) - Vegetarian  Additional Information:  generalized diet counseling, vegetarian    Oziel has been working at increasing his activity. He has been working out ~5 days/week.  Activity: 3 days/week at the gym - weights/cardio (60-90 minutes)   Activity: 2 days/week at home - strength/core (30 minutes)     NUTRITION HISTORY  Current diet/appetite: Vegetarian for the past 5 years. He eats pescatarian, cheese and eggs  Oziel presents today with desire to review diet and obtain ideas on how to optimize current food choices and to ensure adequacy.     Diet recall:  Mon, 1/23 Bfast - coffee, cereal (special K) with blueberries   Lunch - tuna sandwich (bonds/ mustard/ lettuce/ celery/ onion/ pickle) with chips and apple   Dinner - Noemy s salomon (spinach, sweet potatoes) with rice, naan and grapes   Drink - 1.5 glasses of wine     Tues, 1/24 Bfast - [place] coffee and bagel with cream cheese   Lunch - green +grain wrap and chips   Dinner - four slices of pizza (Jakeenos) and one slice of cheese bread   Dessert - small cup of ice cream (s. Joes)  "  Drinks - two beers     Wed, 1/25   Bfast - two cups of coffee, bfast bar; dried fruit slice from work   Lunch - falafel gyro w/ side salad   Dinner - veggie Asian rice bowl w/ side salad and toast   Drinks - coconut water     Thurs, 1/26   Bfast - yogurt with granola, avocado toast, espresso, one cup coffee  Lunch- half veggie sandwich with cup of broccoli cheese soap and ice tea   Dinner - mc deng half veggie sub with chips and chocolate bar   Drinks - one beer     Fri, 1/27   Bfast - banana, fruit bowl, eggs, hasbrowns, veggies    Oziel is interested in gaining muscle mass.    He is able to drink more water when he goes to the gym.   ANTHROPOMETRICS  Height: 6'0\"  Weight: 175 lbs/79kg  BMI: 23  Weight Status:  Normal BMI  IBW: 178 lbs (98%)  Weight History:   Wt Readings from Last 8 Encounters:   12/19/22 79.4 kg (175 lb)   07/05/18 63 kg (139 lb)   07/10/17 63 kg (139 lb)   05/01/17 63.6 kg (140 lb 3.2 oz)   08/05/16 66.1 kg (145 lb 12.8 oz)   06/02/16 63.9 kg (140 lb 12.8 oz)   05/27/15 65.3 kg (144 lb)   11/28/14 67.6 kg (149 lb)     Dosing Weight: 79kg    Medications/vitamins/minerals/herbals:   Reviewed    Labs:   Labs reviewed    ASSESSED NUTRITION NEEDS:  Estimated Energy Needs: 2400 kcals (25-30 Kcal/Kg)  Justification: maintenance  Estimated Protein Needs: 75-90 grams protein (1-1.2 g pro/Kg)  Justification: maintenance    NUTRITION DIAGNOSIS:  No nutrition diagnosis identified at this time     INTERVENTIONS  Provided written & verbal education:     - Reviewed nutrition and hydration goals.  Advised pt to aim for at least 2400kcal and 75-90g protein daily.    Advised pt to aim for 8 cups non-caffeine containing beverages (water/electrolytes) daily.    - Reviewed sources of protein (animal and plant based). Reviewed ways to complete a protein if having a vegan meal.  Discussed ways to combine plant based proteins with grains.   - Reviewed and encouraged Mediterranean diet guidelines.    - Reviewed " vitamin/minerals.  Discussed vitamin D and vitamin D lab draw.  Oziel consumes little dairy, has fair skin and doesn't spend much time in the sun.  RD sent message to MD for vitamin D lab draw.   - Provided praise for healthful food choices.  Provided ideas on batch cooking and meal planning.    Provided pt with corresponding education materials/handouts on:  --Sources of protein (vegan)  --Sources of protein  --Fall Thrive series (batch cooked recipe ideas)    Pt verbalize understanding of materials provided during consult.   Patient Understanding: good  Expected patient engagement: good     Goals  --Calories: 2400/day  --Protein: 75-90 grams/day  --Fiber: 25 grams/day  --Vitamins/minerals: Vitamin D 1000IU/day    Follow-Up Plans: Pt has RD contact information for questions.      MONITORING AND EVALUATION:  Patti Albert RD, , LD        Again, thank you for allowing me to participate in the care of your patient.        Sincerely,        Patti Albert RD

## 2023-05-23 ENCOUNTER — OFFICE VISIT (OUTPATIENT)
Dept: DERMATOLOGY | Facility: CLINIC | Age: 33
End: 2023-05-23
Payer: COMMERCIAL

## 2023-05-23 DIAGNOSIS — B07.9 VERRUCA VULGARIS: ICD-10-CM

## 2023-05-23 DIAGNOSIS — D22.9 MULTIPLE MELANOCYTIC NEVI: Primary | ICD-10-CM

## 2023-05-23 DIAGNOSIS — Z12.83 SKIN CANCER SCREENING: ICD-10-CM

## 2023-05-23 PROCEDURE — 99203 OFFICE O/P NEW LOW 30 MIN: CPT | Mod: 25 | Performed by: DERMATOLOGY

## 2023-05-23 PROCEDURE — 17110 DESTRUCTION B9 LES UP TO 14: CPT | Performed by: DERMATOLOGY

## 2023-05-23 ASSESSMENT — PAIN SCALES - GENERAL: PAINLEVEL: NO PAIN (0)

## 2023-05-23 NOTE — PROGRESS NOTES
Dermatology New Patient Visit    Assessment and plan:  1.  Overall benign skin cancer screening exam.  He was reassured regarding his benign findings today.  He does have Vila type I skin, with blue eyes and red hair and many ephiledes.  For this reason we counseled him that sun protection is of high importance for him in minimizing his risk of melanoma.  We reviewed the signs and symptoms of nonmelanoma skin cancers as well as the ABCDEs of melanoma.  We discussed the importance of sun protective behaviors including high SPF sunscreen and sun protective clothing.  2.  Benign skin findings including multiple melanocytic nevi and benign angiomas.  Reassurance was provided.  3.  Verruca vulgaris x2, on the right index finger and right great toe.  After informed verbal consent, 2 lesions were treated with liquid nitrogen times 10 seconds x 2 cycles.  The patient tolerated this without complication.    We suggested he return to clinic for a full-body skin check in approximately 2 years time, sooner for any new or changing lesions.      Silverio Guzman MD  Dermatology Attending    _____________________________________________________________________      Chief complaint: Skin cancer screening exam.    History of present illness:  Oziel is a very pleasant 33-year-old male who presents as a new patient today for a skin cancer screening exam.  He has no personal history of skin cancer, melanoma or otherwise, but would like a skin check given that he has very fair skin and many freckles.  He does not believe he has any new or changing moles and denies any areas of nonhealing sores.  He does note 2 small wartlike bumps, 1 on his right index finger and 1 on the right great toe.  These have not previously been treated by a physician.    Physical exam:  General: In general this is a well-appearing well-nourished male with normal mood and affect was oriented x3  Skin: A cutaneous exam of the head, neck, chest, abdomen,  back, bilateral upper and lower extremities was performed.  Diffusely in sun exposed areas, there are light tan macules consistent with solar lentigines and ephiledes.  On the back and the rest of the trunk, there are scattered 2 to 5 mm brown macules and flattopped papules, without atypical findings on gross exam or on dermoscopy.  On the right index finger on the right great toe, there are 2 hyperkeratotic verrucous 2 to 4 mm papules.

## 2023-05-23 NOTE — NURSING NOTE
Dermatology Rooming Note    Oziel Vann's goals for this visit include:   Chief Complaint   Patient presents with     Skin Check     FBSE. Cancer screening. Has an area of concern with a tracee under his right armpit     Rosalind Dunbar, Visit Facilitator

## 2023-05-23 NOTE — LETTER
5/23/2023       RE: Oziel Vann  [address] [facility] [address]     Dear Colleague,    Thank you for referring your patient, Oziel Vann, to the [facility]. Please see a copy of my visit note below.    Dermatology New Patient Visit    Assessment and plan:  1.  Overall benign skin cancer screening exam.  He was reassured regarding his benign findings today.  He does have Vila type I skin, with blue eyes and red hair and many ephiledes.  For this reason we counseled him that sun protection is of high importance for him in minimizing his risk of melanoma.  We reviewed the signs and symptoms of nonmelanoma skin cancers as well as the ABCDEs of melanoma.  We discussed the importance of sun protective behaviors including high SPF sunscreen and sun protective clothing.  2.  Benign skin findings including multiple melanocytic nevi and benign angiomas.  Reassurance was provided.  3.  Verruca vulgaris x2, on the right index finger and right great toe.  After informed verbal consent, 2 lesions were treated with liquid nitrogen times 10 seconds x 2 cycles.  The patient tolerated this without complication.    We suggested he return to clinic for a full-body skin check in approximately 2 years time, sooner for any new or changing lesions.      Silverio Guzman MD  Dermatology Attending    _____________________________________________________________________      Chief complaint: Skin cancer screening exam.    History of present illness:  Oziel is a very pleasant 33-year-old male who presents as a new patient today for a skin cancer screening exam.  He has no personal history of skin cancer, melanoma or otherwise, but would like a skin check given that he has very fair skin and many freckles.  He does not believe he has any new or changing moles and denies any areas of nonhealing sores.  He does note 2 small wartlike  bumps, 1 on his right index finger and 1 on the right great toe.  These have not previously been treated by a physician.    Physical exam:  General: In general this is a well-appearing well-nourished male with normal mood and affect was oriented x3  Skin: A cutaneous exam of the head, neck, chest, abdomen, back, bilateral upper and lower extremities was performed.  Diffusely in sun exposed areas, there are light tan macules consistent with solar lentigines and ephiledes.  On the back and the rest of the trunk, there are scattered 2 to 5 mm brown macules and flattopped papules, without atypical findings on gross exam or on dermoscopy.  On the right index finger on the right great toe, there are 2 hyperkeratotic verrucous 2 to 4 mm papules.

## 2023-06-20 PROBLEM — B07.9 VERRUCA VULGARIS: Status: ACTIVE | Noted: 2023-06-20

## 2023-06-20 PROBLEM — D22.9 MULTIPLE MELANOCYTIC NEVI: Status: ACTIVE | Noted: 2023-06-20

## 2023-06-20 PROBLEM — Z12.83 SKIN CANCER SCREENING: Status: ACTIVE | Noted: 2023-06-20

## 2023-11-20 ENCOUNTER — PATIENT OUTREACH (OUTPATIENT)
Dept: CARE COORDINATION | Facility: CLINIC | Age: 33
End: 2023-11-20
Payer: COMMERCIAL

## 2023-11-27 ENCOUNTER — HOSPITAL ENCOUNTER (EMERGENCY)
Facility: CLINIC | Age: 33
End: 2023-11-27
Payer: COMMERCIAL

## 2023-11-27 ENCOUNTER — OFFICE VISIT (OUTPATIENT)
Dept: URGENT CARE | Facility: URGENT CARE | Age: 33
End: 2023-11-27
Payer: COMMERCIAL

## 2023-11-27 ENCOUNTER — NURSE TRIAGE (OUTPATIENT)
Dept: FAMILY MEDICINE | Facility: CLINIC | Age: 33
End: 2023-11-27

## 2023-11-27 VITALS
TEMPERATURE: 99.2 F | OXYGEN SATURATION: 97 % | WEIGHT: 173 LBS | SYSTOLIC BLOOD PRESSURE: 130 MMHG | BODY MASS INDEX: 23.33 KG/M2 | RESPIRATION RATE: 16 BRPM | DIASTOLIC BLOOD PRESSURE: 72 MMHG | HEART RATE: 80 BPM

## 2023-11-27 DIAGNOSIS — S61.452A DOG BITE OF LEFT HAND WITH INFECTION, INITIAL ENCOUNTER: Primary | ICD-10-CM
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
## 2023-11-27 DIAGNOSIS — L08.9 DOG BITE OF LEFT HAND WITH INFECTION, INITIAL ENCOUNTER: Primary | ICD-10-CM

## 2023-11-27 PROCEDURE — 99213 OFFICE O/P EST LOW 20 MIN: CPT

## 2023-11-27 NOTE — CONFIDENTIAL NOTE
Pt needs to be seen in ED as if source of dog bite is unsure of history needs to be evaluated in ED and possibly needs Rabies vaccination.   LVM for pt.   Thanks,   Max Hyatt RN  [facility]

## 2023-11-27 NOTE — PROGRESS NOTES
Chief Complaint   Patient presents with    Urgent Care     Dog bite left middle finger x yesterday noon - bleeding, throbbing swollen finger - Neighbor's dog jumped over a fence and attacked his dog - Pt was told the dog's rabies vaccination  2023          ICD-10-CM    1. Dog bite of left hand with infection, initial encounter  S61.452A amoxicillin-clavulanate (AUGMENTIN) 875-125 MG tablet    L08.9     W54.0XXA       Reviewed algorithm for treating dog bites and up-to-date.  Recommendation was made that the dog be quarantined by animal control to monitor and be sure there are no signs of rabies.  If the dog begins to show signs of rabies prophylaxis would need to be given if not or the dog is tested and found to be negative no prophylaxis would be given.  Patient is advised to go to the emergency room for this treatment if the dog shows sinus rabies.    Antibiotic to treat infection that is already present.    Subjective     Oziel Vann is an 33 year old male who presents to clinic today for dog bite to the left middle finger yesterday around noon.  He was breaking up a fight between his dog and the neighbors dog that had jumped across the fence.  He is unsure exactly which dog bit him.  His dog is current on vaccinations but the other dog's rabies vaccine  in  of this year.  Patient did make a police report.      ROS: 10 point ROS neg other than the symptoms noted above in the HPI.       Objective    /72 (BP Location: Left arm, Patient Position: Sitting, Cuff Size: Adult Regular)   Pulse 80   Temp 99.2  F (37.3  C) (Tympanic)   Resp 16   Wt 78.5 kg (173 lb)   SpO2 97%   BMI 23.33 kg/m    Nurses notes and VS have been reviewed.    Physical Exam       Alert and oriented,  Left middle finger has a puncture on the palmar side with small amount of ecchymosis developing purulent looking areas and erythema with swelling that extends from the tip to the PIP joint      Amanda Ramsay  APRN, CNP  [facility] Provider    The use of Dragon/Air Button dictation services may have been used to construct the content in this note; any grammatical or spelling errors are non-intentional. Please contact the author of this note directly if you are in need of any clarification.

## 2023-11-27 NOTE — TELEPHONE ENCOUNTER
Pt had altercation with neighbors dog,  Broke skin  Dog bite yesterday.   No medical hisotry of dog. Pt would like to be seen, added to MD schedule for today.   Thanks,   Max Hyatt RN  [facility]       Reason for Disposition   No prior tetanus shots (or is not fully vaccinated) and any wound (e.g., cut, scrape)    Additional Information   Negative: Major bleeding (actively dripping or spurting) that can't be stopped   Negative: Sounds like a life-threatening emergency to the triager   Negative: Any break in skin from BITE (e.g., cut, puncture, or scratch) and WILD animal at risk for RABIES (e.g., bat, raccoon, loredo, skunk, coyote, other carnivores; see Background for list)   Negative: Any break in skin from BITE (e.g., cut, puncture, or scratch) and PET animal (e.g., dog, cat, or ferret) at risk for RABIES (e.g., sick, stray, unprovoked bite, developing country)   Negative: Any break in skin from BITE (e.g., cut, puncture, or scratch) and monkey   Negative: Cut (length > 1/8 inch or 3 mm) or skin tear and any animal  (Exception: Superficial scratch that doesn't go through dermis.)   Negative: Bleeding won't stop after 10 minutes of direct pressure (using correct technique)   Negative: EXPOSURE of non-intact skin (e.g., exposed person has dermatitis, abrasion, wound)  with animal BODY FLUID (e.g., saliva such as licking, blood, brain) and animal at high-risk for RABIES (e.g., bat, raccoon, loredo, skunk, coyote, other carnivores)   Negative: Sounds like a serious bite injury to the triager   Negative: SEVERE pain (e.g., excruciating)   Negative: Puncture wound (hole through the skin) from cat (teeth or claws)   Negative: Puncture wound or small cut on face  (Exception: Puncture from small pet, such as a gerbil, mouse, hamster, puppy; or shallow scratches.)   Negative: Puncture wound or small cut on hands or genitals  (Exception: Puncture from small pet, such as a gerbil, mouse,  hamster, puppy; or shallow scratches.)   Negative: Puncture wound and weak immune system (e.g., HIV positive, cancer chemo, splenectomy, organ transplant, chronic steroids)   Negative: Looks infected (spreading redness, red streak, or pus)    Protocols used: Animal Bite-A-OH

## 2023-12-04 ENCOUNTER — PATIENT OUTREACH (OUTPATIENT)
Dept: CARE COORDINATION | Facility: CLINIC | Age: 33
End: 2023-12-04
Payer: COMMERCIAL

## 2024-03-16 ENCOUNTER — HEALTH MAINTENANCE LETTER (OUTPATIENT)
Age: 34
End: 2024-03-16

## 2024-06-24 ENCOUNTER — OFFICE VISIT (OUTPATIENT)
Dept: FAMILY MEDICINE | Facility: CLINIC | Age: 34
End: 2024-06-24
Payer: COMMERCIAL

## 2024-06-24 VITALS
HEIGHT: 70 IN | RESPIRATION RATE: 20 BRPM | HEART RATE: 74 BPM | OXYGEN SATURATION: 98 % | TEMPERATURE: 98 F | SYSTOLIC BLOOD PRESSURE: 118 MMHG | DIASTOLIC BLOOD PRESSURE: 70 MMHG | BODY MASS INDEX: 24.01 KG/M2 | WEIGHT: 167.7 LBS

## 2024-06-24 DIAGNOSIS — G89.29 CHRONIC LOW BACK PAIN WITHOUT SCIATICA, UNSPECIFIED BACK PAIN LATERALITY: ICD-10-CM

## 2024-06-24 DIAGNOSIS — Z00.00 ROUTINE GENERAL MEDICAL EXAMINATION AT A HEALTH CARE FACILITY: Primary | ICD-10-CM

## 2024-06-24 DIAGNOSIS — D22.9 MULTIPLE MELANOCYTIC NEVI: ICD-10-CM

## 2024-06-24 DIAGNOSIS — M54.50 CHRONIC LOW BACK PAIN WITHOUT SCIATICA, UNSPECIFIED BACK PAIN LATERALITY: ICD-10-CM

## 2024-06-24 DIAGNOSIS — R07.89 OTHER CHEST PAIN: ICD-10-CM

## 2024-06-24 PROBLEM — B07.9 VERRUCA VULGARIS: Status: RESOLVED | Noted: 2023-06-20 | Resolved: 2024-06-24

## 2024-06-24 PROCEDURE — 99395 PREV VISIT EST AGE 18-39: CPT | Performed by: INTERNAL MEDICINE

## 2024-06-24 SDOH — HEALTH STABILITY: PHYSICAL HEALTH: ON AVERAGE, HOW MANY MINUTES DO YOU ENGAGE IN EXERCISE AT THIS LEVEL?: 30 MIN

## 2024-06-24 SDOH — HEALTH STABILITY: PHYSICAL HEALTH: ON AVERAGE, HOW MANY DAYS PER WEEK DO YOU ENGAGE IN MODERATE TO STRENUOUS EXERCISE (LIKE A BRISK WALK)?: 3 DAYS

## 2024-06-24 ASSESSMENT — SOCIAL DETERMINANTS OF HEALTH (SDOH): HOW OFTEN DO YOU GET TOGETHER WITH FRIENDS OR RELATIVES?: TWICE A WEEK

## 2024-06-24 ASSESSMENT — PAIN SCALES - GENERAL: PAINLEVEL: MILD PAIN (2)

## 2024-06-24 NOTE — COMMUNITY RESOURCES LIST (ENGLISH)
June 24, 2024           YOUR PERSONALIZED LIST OF SERVICES & PROGRAMS           & SHELTER    Housing      Serving People - Shelter for families  [address] [place] [address] (Distance: 3.7 miles)  Phone: [phone]  Language: English  Fee: Free      [facility] Hotline  [address] [facility] [address]th [place] [address] (Distance: 3.5 miles)  Phone: [phone]) [phone]  Language: English  Accessibility: Translation services      [facility]  Phone: [phone]  Website: https://www.[facility] CenterJamii/  Language: English, Hmong, Oromo, Zimbabwean, Nicaraguan  Hours: Mon 9:00 AM - 5:00 PM Tue 9:00 AM - 5:00 PM Wed 9:00 AM - 5:00 PM Thu 9:00 AM - 5:00 PM Fri 9:00 AM - 5:00 PM  Fee: Insurance  Accessibility: Blind accommodation, Deaf or hard of hearing, Translation services  Transportation Options: Free transportation    Case Management      [facility] With [facility] Independent  [address] [place] [address] (Distance: 6.2 miles)  Phone: [phone]  Website: [email or web address].[employer].Behind the Burner/contact  Language: English, Nicaraguan  Accessibility: Ada accessible, Blind accommodation, Deaf or hard of hearing, Translation services      Living - Housing Support-[facility] (HWS-I)  [address] [place] [address] (Distance: 2.8 miles)  Phone: [phone]  Website: https://www.[employer]  Language: Setswana, English, Zimbabwean  Fee: Insurance      [facility] [facility] - Housing Stabilization Services  Phone: [phone] [phone]  Website: https://homebasemn.com/  Language: English  Hours: Mon 8:00 AM - 4:00 PM Tue 8:00 AM - 4:00 PM Wed 8:00 AM - 4:00 PM Thu 8:00 AM - 4:00 PM Fri 8:00 AM - 4:00 PM  Fee: Free  Accessibility: Blind accommodation, Deaf or hard of hearing  Transportation Options: Free transportation    Drop-In Services      In[facility]ated - Drop-in center or day shelter  [address] [place] [address]  (Distance: 5.1 miles)  Phone: [phone]  Language: English  Fee: Free      [facility] - Drop-in center or day shelter  [address] [place] [address] (Distance: 2.7 miles)  Phone: [phone]  Language: English  Fee: Free  Accessibility: Ada accessible, Translation services      [facility] POSTAL SERVICE - MAIL SERVICE FOR THE HOMELESS  Phone: [phone]) [phone]  Website: https://www.Sumerian               IMPORTANT NUMBERS & WEBSITES        Emergency Services  911  .   United Way  211 http://211unitedway.org  .   Poison Control  (631) 469-6635 http://[place]poison.org http://wisconsinpoison.org  .     Suicide and Crisis Lifeline  988 http://988[employer] Developmentline.org  .   Childhelp [place] Child Abuse Hotline  721.448.8264 http://Childhelphotline.org   .   [place] Sexual Assault Hotline  (981) 675-4658 (HOPE) http://Qiro.Awesome Maps   .     [place] Runaway Safeline  (282) 365-1636 (RUNAWAY) http://AdGrok.Awesome Maps  .   Pregnancy & Postpartum Support  Call/text 750-377-3255  [place]: http://ppsupportmn.org  [place]: http://Gigathlete.com/wi  .   Substance Abuse National Helpline ([facility])  738-800-HELP (9573) http://Findtreatment.gov   .                DISCLAIMER: These resources have been generated via the [employer] Platform. [employer]  does not endorse any service providers mentioned in this resource list. [employer] does not guarantee that the services mentioned in this resource list will be available to you or will improve your health or wellness.    [facility]

## 2024-06-24 NOTE — PROGRESS NOTES
Preventive Care Visit  [facility]  Adrianesommer Chawla DO, Internal Medicine  Jun 24, 2024      Assessment & Plan     (Z00.00) Routine general medical examination at a health care facility  (primary encounter diagnosis)  Comment:   Plan: Lipid panel reflex to direct LDL Non-fasting,         Comprehensive metabolic panel (BMP + Alb, Alk         Phos, ALT, AST, Total. Bili, TP)  Immunizations: UTD, COVID this fall  Labs: Lipids, Diabetes screen   Discussed healthy lifestyle and aging recommendations including regular exercise, adequate and regular sleep, 5+ fruits and veggies daily.    (D22.9) Multiple melanocytic nevi  Comment: Has routine skin checks with derm.    (M54.50,  G89.29) Chronic low back pain without sciatica, unspecified back pain laterality  Comment: Not significant today- but wondering about other mgmt like with OMT- recommend Dr. Petersen for OMT.  Stretches, core work also recommended.    (R07.89) Other chest pain  Comment: Does not sound cardiac- sounds more like muscle strain; not exertional, not pressure- improving over time.  Monitor for now.       Patient has been advised of split billing requirements and indicates understanding: Yes        Counseling  Appropriate preventive services were discussed with this patient, including applicable screening as appropriate for fall prevention, nutrition, physical activity, Tobacco-use cessation, weight loss and cognition.  Checklist reviewing preventive services available has been given to the patient.  Reviewed patient's diet, addressing concerns and/or questions.   He is at risk for lack of exercise and has been provided with information to increase physical activity for the benefit of his well-being.       Patient Instructions   Patient Education  Preventive Care Advice   This is general advice we often give to help people stay healthy. Your care team may have specific advice just for you. Please talk to your care team  "about your own preventive care needs.  Lifestyle  Exercise at least 150 minutes each week (30 minutes a day, 5 days a week).  Do muscle strengthening activities 2 days a week. These help control your weight and prevent disease.  No smoking.  Wear sunscreen to prevent skin cancer.  Have your home tested for radon every 2 to 5 years. Radon is a colorless, odorless gas that can harm your lungs. To learn more, go to www.health.[facility].mn. and search for \"Radon in Homes.\"  Keep guns unloaded and locked up in a safe place like a safe or gun vault, or, use a gun lock and hide the keys. Always lock away bullets separately. To learn more, visit [employer].mn.gov and search for \"safe gun storage.\"  Nutrition  Eat 5 or more servings of fruits and vegetables each day.  Try wheat bread, brown rice and whole grain pasta (instead of white bread, rice, and pasta).  Get enough calcium and vitamin D. Check the label on foods and aim for 100% of the RDA (recommended daily allowance).  Regular exams  Have a dental exam and cleaning every 6 months.  See your health care team every year to talk about:  Any changes in your health.  Any medicines your care team has prescribed.  Preventive care, family planning, and ways to prevent chronic diseases.  Shots (vaccines)   HPV shots (up to age 26), if you've never had them before.  Hepatitis B shots (up to age 59), if you've never had them before.  COVID-19 shot: Get this shot when it's due.  Flu shot: Get a flu shot every year.  Tetanus shot: Get a tetanus shot every 10 years.  Pneumococcal, hepatitis A, and RSV shots: Ask your care team if you need these based on your risk.  Shingles shot (for age 50 and up).  General health tests  Diabetes screening:  Starting at age 35, Get screened for diabetes at least every 3 years.  If you are younger than age 35, ask your care team if you should be screened for diabetes.  Cholesterol test: At age 39, start having a cholesterol test every 5 years, or more " often if advised.  Bone density scan (DEXA): At age 50, ask your care team if you should have this scan for osteoporosis (brittle bones).  Hepatitis C: Get tested at least once in your life.  Abdominal aortic aneurysm screening: Talk to your doctor about having this screening if you:  Have ever smoked; and  Are biologically male; and  Are between the ages of 65 and 75.  STIs (sexually transmitted infections)  Before age 24: Ask your care team if you should be screened for STIs.  After age 24: Get screened for STIs if you're at risk. You are at risk for STIs (including HIV) if:  You are sexually active with more than one person.  You don't use condoms every time.  You or a partner was diagnosed with a sexually transmitted infection.  If you are at risk for HIV, ask about PrEP medicine to prevent HIV.  Get tested for HIV at least once in your life, whether you are at risk for HIV or not.  Cancer screening tests  Cervical cancer screening: If you have a cervix, begin getting regular cervical cancer screening tests at age 21. Most people who have regular screenings with normal results can stop after age 65. Talk about this with your provider.  Breast cancer scan (mammogram): If you've ever had breasts, begin having regular mammograms starting at age 40. This is a scan to check for breast cancer.  Colon cancer screening: It is important to start screening for colon cancer at age 45.  Have a colonoscopy test every 10 years (or more often if you're at risk) Or, ask your provider about stool tests like a FIT test every year or Cologuard test every 3 years.  To learn more about your testing options, visit: www.[employer]/851844.pdf.  For help making a decision, visit: jaspal/iu41452.  Prostate cancer screening test: If you have a prostate and are age 55 to 69, ask your provider if you would benefit from a yearly prostate cancer screening test.  Lung cancer screening: If you are a current or former smoker age 50 to 80, ask  "your care team if ongoing lung cancer screenings are right for you.  For informational purposes only. Not to replace the advice of your health care provider. Copyright   2023 [facility]. All rights reserved. Clinically reviewed by the [facility] Transitions Program. Tech21 [id number] - REV 04/24.         Paulette Ludwig is a 34 year old, presenting for the following:  Physical        6/24/2024     3:30 PM   Additional Questions   Roomed by Malika   Accompanied by alone         6/24/2024     3:30 PM   Patient Reported Additional Medications   Patient reports taking the following new medications none        Health Care Directive  Patient does not have a Health Care Directive or Living Will: Discussed advance care planning with patient; information given to patient to review.    HPI    A few weeks ago noticed some chest pain under left pectoralis- noticed after basketball game.  Then the pain seemed to move to mid abdomen to right side then around to the back.  Felt like sharp pain.  Felt tight.  Lingered for a few days and weeks not to the point where he couldn't breathe or that his heart was racing- was more tightness.    Played basketball a couple weeks later- at that point the \"sharpness or pressure\" waned so felt fine to play- noticed that he needed to take breaks and pause- felt a little more breathless after a couple up and downs the court.     Noticing some back pain after physical activity.  Wondering about seeing a chiropractor or about other options.          6/24/2024   General Health   How would you rate your overall physical health? Good   Feel stress (tense, anxious, or unable to sleep) Not at all            6/24/2024   Nutrition   Three or more servings of calcium each day? Yes   Diet: Vegetarian/vegan   How many servings of fruit and vegetables per day? (!) 2-3   How many sweetened beverages each day? 0-1            6/24/2024   Exercise   Days per week of moderate/strenous " exercise 3 days   Average minutes spent exercising at this level 30 min            6/24/2024   Social Factors   Frequency of gathering with friends or relatives Twice a week   Worry food won't last until get money to buy more No   Food not last or not have enough money for food? No   Do you have housing? (Housing is defined as stable permanent housing and does not include staying ouside in a car, in a tent, in an abandoned building, in an overnight shelter, or couch-surfing.) No   Are you worried about losing your housing? No   Lack of transportation? No   Unable to get utilities (heat,electricity)? No   Want help with housing or utility concern? No      (!) HOUSING CONCERN PRESENT      6/24/2024   Dental   Dentist two times every year? Yes            6/24/2024   TB Screening   Were you born outside of the [place]? No            Today's PHQ-2 Score:       6/24/2024     6:40 AM   PHQ-2 ( 1999 Pfizer)   Q1: Little interest or pleasure in doing things 0   Q2: Feeling down, depressed or hopeless 0   PHQ-2 Score 0   Q1: Little interest or pleasure in doing things Not at all   Q2: Feeling down, depressed or hopeless Not at all   PHQ-2 Score 0           6/24/2024   Substance Use   Alcohol more than 3/day or more than 7/wk No   Do you use any other substances recreationally? No        Social History     Tobacco Use    Smoking status: Never    Smokeless tobacco: Never   Vaping Use    Vaping status: Never Used   Substance Use Topics    Alcohol use: Yes     Comment: 3-4 beers per week    Drug use: No           6/24/2024   STI Screening   New sexual partner(s) since last STI/HIV test? No            6/24/2024   Contraception/Family Planning   Questions about contraception or family planning No           Reviewed and updated as needed this visit by Provider   Tobacco  Allergies  Meds  Problems  Med Hx  Surg Hx  Fam Hx            Past Medical History:   Diagnosis Date    Closed fracture of unspecified part of humerus 03/01/2005  "   Left distal Humerus fx.    COVID-19 11/2022    Radius fracture, right 01/01/1995    Tibia fracture, right 01/01/1999     Past Surgical History:   Procedure Laterality Date    SURGICAL HISTORY OF -   03/2005    ORIF Left Humerus Fx.  [facility]         Lab work is in process         Objective    Exam  /70 (BP Location: Right arm, Patient Position: Sitting, Cuff Size: Adult Regular)   Pulse 74   Temp 98  F (36.7  C) (Temporal)   Resp 20   Ht 1.79 m (5' 10.47\")   Wt 76.1 kg (167 lb 11.2 oz)   SpO2 98%   BMI 23.74 kg/m     Estimated body mass index is 23.74 kg/m  as calculated from the following:    Height as of this encounter: 1.79 m (5' 10.47\").    Weight as of this encounter: 76.1 kg (167 lb 11.2 oz).    Physical Exam  GENERAL: alert and no distress  EYES: Eyes grossly normal to inspection, PERRL and conjunctivae and sclerae normal  HENT: ear canals and TM's normal, nose and mouth without ulcers or lesions  RESP: lungs clear to auscultation - no rales, rhonchi or wheezes  CV: regular rate and rhythm, normal S1 S2, no S3 or S4, no murmur, click or rub, no peripheral edema  ABDOMEN: soft, nontender, no hepatosplenomegaly, no masses and bowel sounds normal  MS: no gross musculoskeletal defects noted, no edema  SKIN: no suspicious lesions or rashes  NEURO: Normal strength and tone, mentation intact and speech normal  PSYCH: mentation appears normal, affect normal/bright        Signed Electronically by: Adriane Chawla, DO    "

## 2024-06-25 NOTE — PATIENT INSTRUCTIONS
"Patient Education   Preventive Care Advice   This is general advice we often give to help people stay healthy. Your care team may have specific advice just for you. Please talk to your care team about your own preventive care needs.  Lifestyle  Exercise at least 150 minutes each week (30 minutes a day, 5 days a week).  Do muscle strengthening activities 2 days a week. These help control your weight and prevent disease.  No smoking.  Wear sunscreen to prevent skin cancer.  Have your home tested for radon every 2 to 5 years. Radon is a colorless, odorless gas that can harm your lungs. To learn more, go to www.health.[facility].mn.us and search for \"Radon in Homes.\"  Keep guns unloaded and locked up in a safe place like a safe or gun vault, or, use a gun lock and hide the keys. Always lock away bullets separately. To learn more, visit [employer].[place].gov and search for \"safe gun storage.\"  Nutrition  Eat 5 or more servings of fruits and vegetables each day.  Try wheat bread, brown rice and whole grain pasta (instead of white bread, rice, and pasta).  Get enough calcium and vitamin D. Check the label on foods and aim for 100% of the RDA (recommended daily allowance).  Regular exams  Have a dental exam and cleaning every 6 months.  See your health care team every year to talk about:  Any changes in your health.  Any medicines your care team has prescribed.  Preventive care, family planning, and ways to prevent chronic diseases.  Shots (vaccines)   HPV shots (up to age 26), if you've never had them before.  Hepatitis B shots (up to age 59), if you've never had them before.  COVID-19 shot: Get this shot when it's due.  Flu shot: Get a flu shot every year.  Tetanus shot: Get a tetanus shot every 10 years.  Pneumococcal, hepatitis A, and RSV shots: Ask your care team if you need these based on your risk.  Shingles shot (for age 50 and up).  General health tests  Diabetes screening:  Starting at age 35, Get screened for diabetes at least " every 3 years.  If you are younger than age 35, ask your care team if you should be screened for diabetes.  Cholesterol test: At age 39, start having a cholesterol test every 5 years, or more often if advised.  Bone density scan (DEXA): At age 50, ask your care team if you should have this scan for osteoporosis (brittle bones).  Hepatitis C: Get tested at least once in your life.  Abdominal aortic aneurysm screening: Talk to your doctor about having this screening if you:  Have ever smoked; and  Are biologically male; and  Are between the ages of 65 and 75.  STIs (sexually transmitted infections)  Before age 24: Ask your care team if you should be screened for STIs.  After age 24: Get screened for STIs if you're at risk. You are at risk for STIs (including HIV) if:  You are sexually active with more than one person.  You don't use condoms every time.  You or a partner was diagnosed with a sexually transmitted infection.  If you are at risk for HIV, ask about PrEP medicine to prevent HIV.  Get tested for HIV at least once in your life, whether you are at risk for HIV or not.  Cancer screening tests  Cervical cancer screening: If you have a cervix, begin getting regular cervical cancer screening tests at age 21. Most people who have regular screenings with normal results can stop after age 65. Talk about this with your provider.  Breast cancer scan (mammogram): If you've ever had breasts, begin having regular mammograms starting at age 40. This is a scan to check for breast cancer.  Colon cancer screening: It is important to start screening for colon cancer at age 45.  Have a colonoscopy test every 10 years (or more often if you're at risk) Or, ask your provider about stool tests like a FIT test every year or Cologuard test every 3 years.  To learn more about your testing options, visit: www.TwitJump/009516.pdf.  For help making a decision, visit: jaspal/tf19654.  Prostate cancer screening test: If you have a  prostate and are age 55 to 69, ask your provider if you would benefit from a yearly prostate cancer screening test.  Lung cancer screening: If you are a current or former smoker age 50 to 80, ask your care team if ongoing lung cancer screenings are right for you.  For informational purposes only. Not to replace the advice of your health care provider. Copyright   2023 [facility]. All rights reserved. Clinically reviewed by the [facility] Transitions Program. inexio [id number] - REV 04/24.

## 2024-08-08 NOTE — PROGRESS NOTES
"HPI      Oziel is a 34 year old who presents today for Osteopathic Evaluation.   Chief Complaint   Patient presents with    OMT     Referred by PCP for chronic back pain:  Started after basketball injury/strain  April 2024  Was reaching for a rebound, reaching up  Started over chest, traveled across chest, then wrapped around to the back  Upper/mid back bilateral  No low back pain  No radiation of pain  Worse-nothing  Better-running/walking   Job is sedentary   Exercise: running, walking w/dog, weights at home  No numbness/tingling in arms  Pain is central in upper back    All active medical problems, med list, PMHx, and social Hx updated and reviewed.    Allergies   Allergen Reactions    Cefaclor      rash    Seasonal Allergies      Review of Systems       ROS      10 point ROS neg other than the symptoms noted above here or in the HPI.    Physical Exam     Vitals:    08/09/24 1316   BP: 118/88   BP Location: Right arm   Patient Position: Sitting   Cuff Size: Adult Regular   Pulse: 76   Resp: 21   Temp: 97.9  F (36.6  C)   TempSrc: Temporal   SpO2: 97%   Weight: 76.3 kg (168 lb 3.2 oz)   Height: 1.79 m (5' 10.47\")       X-Ray: none in epic  No results found for any visits on 08/09/24.    Osteopathic Structural Exam and additional MSK exam found below in OMT Procedure Note.    Assessment and Plan     Oziel was seen today for omt.    Diagnoses and all orders for this visit:    Chronic bilateral thoracic back pain  -     OSTEOPATHIC MANIP,7-8 BODY REGN    Somatic dysfunction of head region  -     OSTEOPATHIC MANIP,7-8 BODY REGN    Somatic dysfunction of cervical region  -     OSTEOPATHIC MANIP,7-8 BODY REGN    Somatic dysfunction of thoracic region  -     OSTEOPATHIC MANIP,7-8 BODY REGN    Somatic dysfunction of lumbar region  -     OSTEOPATHIC MANIP,7-8 BODY REGN    Somatic dysfunction of pelvis region  -     OSTEOPATHIC MANIP,7-8 BODY REGN    Somatic dysfunction of lower extremity  -     OSTEOPATHIC MANIP,7-8 BODY " REGN    Somatic dysfunction of upper extremity  -     OSTEOPATHIC MANIP,7-8 BODY REGN        Given that this has been interfering with the patient's quality of life and ADLs, after discussion, informed consent, and medical assessment for safety, we have together decided to address this concern with Osteopathic Manipulative Treatment.    Please see OMT Procedure Note below for the specifics of treatment.         OMT PROCEDURE NOTE    Body Region: Head, Face, and/or Jaw  Somatic Dysfunction: tight OA L>R  Treatment: suboccipital release  Outcome: Improved    Body Region: C-spine / Neck  Somatic Dysfunction: minor paraspinal tightness b/l  Treatment: soft tissue  Outcome: Improved    Body Region: UE, shoulder  Somatic Dysfunction: tight trapezius R>L, tight muscles under b/l shoulder blades  Treatment: muscle energy, pressure point release, passive stretching, scapular decompression  Outcome: Improved    Body Region: thoracic spine  Somatic Dysfunction: tight paraspinal muscles R>L  Treatment: soft tissue  Outcome: Improved    Body Region:  L-spine  Somatic Dysfunction: tight paraspinal muscles R>L  Treatment: soft tissue  Outcome: Improved    Body Region: LE   Somatic Dysfunction: good quad ROM (no tightness), minimal stretch with AMARA  Treatment: muscle energy to quads and AMARA  Outcome: stable, no change (already flexible w/good ROM)    Body Region: Pelvis  Somatic Dysfunction: ASIS anterior on right, minimally tight hip extensors  Treatment: muscle energy to hip flexors & extensors  Outcome: ASIS level    The patient actively participated in OMT and was able to communicate both positive and negative feedback throughout. OMT completed without incident. Patient tolerated treatment well. Patient reported that ROM, function, and/or pain level were improved. Advised that pain is occasionally worse during the first 24 hours after treatment and that drinking more water and taking Tylenol or Ibuprofen often help. Patient  to return in 2-4 week/s or as needed for repeat osteopathic assessment.     Discussed foam rolling. Will send back stretches/strengthening exercises on [employer]Reader [employer]art.    Wing Petersen, DO    Answers submitted by the patient for this visit:  General Questionnaire (Submitted on 8/9/2024)  Chief Complaint: Chronic problems general questions HPI Form  What is the reason for your visit today? : Strained muscle tendon  How many servings of fruits and vegetables do you eat daily?: 2-3  On average, how many sweetened beverages do you drink each day (Examples: soda, juice, sweet tea, etc.  Do NOT count diet or artificially sweetened beverages)?: 0  How many minutes a day do you exercise enough to make your heart beat faster?: 20 to 29  How many days a week do you exercise enough to make your heart beat faster?: 4  How many days per week do you miss taking your medication?: 0    I spent a total of 40 minutes on the day of the visit.   Time spent by me doing chart review, history and exam, documentation and further activities per the note

## 2024-08-09 ENCOUNTER — OFFICE VISIT (OUTPATIENT)
Dept: FAMILY MEDICINE | Facility: CLINIC | Age: 34
End: 2024-08-09
Payer: COMMERCIAL

## 2024-08-09 VITALS
BODY MASS INDEX: 24.08 KG/M2 | WEIGHT: 168.2 LBS | HEART RATE: 76 BPM | DIASTOLIC BLOOD PRESSURE: 88 MMHG | SYSTOLIC BLOOD PRESSURE: 118 MMHG | OXYGEN SATURATION: 97 % | TEMPERATURE: 97.9 F | HEIGHT: 70 IN | RESPIRATION RATE: 21 BRPM

## 2024-08-09 DIAGNOSIS — M99.06 SOMATIC DYSFUNCTION OF LOWER EXTREMITY: ICD-10-CM

## 2024-08-09 DIAGNOSIS — M99.02 SOMATIC DYSFUNCTION OF THORACIC REGION: ICD-10-CM

## 2024-08-09 DIAGNOSIS — G89.29 CHRONIC BILATERAL THORACIC BACK PAIN: Primary | ICD-10-CM

## 2024-08-09 DIAGNOSIS — M99.05 SOMATIC DYSFUNCTION OF PELVIS REGION: ICD-10-CM

## 2024-08-09 DIAGNOSIS — M99.03 SOMATIC DYSFUNCTION OF LUMBAR REGION: ICD-10-CM

## 2024-08-09 DIAGNOSIS — M99.01 SOMATIC DYSFUNCTION OF CERVICAL REGION: ICD-10-CM

## 2024-08-09 DIAGNOSIS — M99.07 SOMATIC DYSFUNCTION OF UPPER EXTREMITY: ICD-10-CM

## 2024-08-09 DIAGNOSIS — M54.6 CHRONIC BILATERAL THORACIC BACK PAIN: Primary | ICD-10-CM

## 2024-08-09 DIAGNOSIS — M99.00 SOMATIC DYSFUNCTION OF HEAD REGION: ICD-10-CM

## 2024-08-09 PROCEDURE — 99215 OFFICE O/P EST HI 40 MIN: CPT | Mod: 25 | Performed by: STUDENT IN AN ORGANIZED HEALTH CARE EDUCATION/TRAINING PROGRAM

## 2024-08-09 PROCEDURE — 98928 OSTEOPATH MANJ 7-8 REGIONS: CPT | Performed by: STUDENT IN AN ORGANIZED HEALTH CARE EDUCATION/TRAINING PROGRAM

## 2024-08-09 ASSESSMENT — PAIN SCALES - GENERAL: PAINLEVEL: NO PAIN (0)

## 2024-08-09 NOTE — PATIENT INSTRUCTIONS
"  Use a foam roller to help with muscle tightness and pain.  Handheld foam roller  Good for hamstrings, quadriceps, calves  Large foam roller  Good for back, glutes, IT band                How to Schedule OMT :  Please make an appointment for \"OMT\" with the .  Please inform them you are scheduling for OMT with Dr. Petersen for ____ (ex. Back pain, neck pain, ect ).    What Is OMT (Osteopathic Manipulative Treatment)?  As part of their education, DOs (doctor of osteopathic medicine) receive special training in the musculoskeletal system (your nerves, bones and muscles).  OMT involves using the hands to diagnose, treat and prevent illness or injury.  Using OMT, your osteopathic physician will move your muscles and joints using techniques including stretching, gentle pressure and resistance.  OMT can help people of all ages and backgrounds. In addition to muscle or joint pain, it can be used to treat a variety of conditions such as asthma, sinus pain, migraines and carpal tunnel syndrome.  For more information, please visit [email or web address]    How does osteopathic manipulative therapy work?  If you're receiving OMT, you should expect a doctor to palpate your body with their hands. This means they may press your joints or muscles with their palms or fingers. They might also pull or rotate your limbs, trunk, or head.  Depending on your reason for receiving OMT, you might be asked to apply force as well, such as lifting your arms, while the doctor applies counterpressure.  You could remain in a particular position for 1 or 2 minutes. Sometimes, the doctor will move your body slowly and continuously, and other times they'll move your body quickly.  OMT might occasionally be uncomfortable, but it should never be painful. If you experience any pain during OMT, let your doctor know immediately.    Osteopathic manipulative therapy vs. chiropractic therapy  OMT and chiropractic therapy can look the same superficially, " but they have some important differences.  Chiropractic therapy generally places a lot of emphasis on your spine, while OMT includes your entire body. Similarly, the goal of chiropractic therapy is often to alleviate pain in a specific part of your body, while OMT is part of a holistic approach to medicine that stresses that all facets of your body are interconnected, including your mental and emotional states.  Chiropractors must be licensed, but they aren't medical doctors. OMT is performed by a medical doctor.        12-Nov-2020 20:00

## 2024-09-12 NOTE — PROGRESS NOTES
HPI      Oziel is a 34 year old who presents today for Osteopathic Evaluation.   No chief complaint on file.    Referred by PCP for chronic back pain:  Started after basketball injury/strain  April 2024  Was reaching for a rebound, reaching up  Started over chest, traveled across chest, then wrapped around to the back  Upper/mid back bilateral  No low back pain  No radiation of pain  Worse-nothing  Better-running/walking   Job is sedentary   Exercise: running, walking w/dog, weights at home  No numbness/tingling in arms  Pain is central in upper back    Today  Another OMT session  Pain mild  Going to [place]    All active medical problems, med list, PMHx, and social Hx updated and reviewed.    Allergies   Allergen Reactions    Cefaclor      rash    Seasonal Allergies      Review of Systems       ROS      10 point ROS neg other than the symptoms noted above here or in the HPI.    Physical Exam     There were no vitals filed for this visit.      X-Ray: none in epic  No results found for any visits on 09/13/24.    Osteopathic Structural Exam and additional MSK exam found below in OMT Procedure Note.    Assessment and Plan     Diagnoses and all orders for this visit:    Chronic bilateral thoracic back pain  -     OSTEOPATHIC MANIP,7-8 BODY REGN    Somatic dysfunction of head region  -     OSTEOPATHIC MANIP,7-8 BODY REGN    Somatic dysfunction of cervical region  -     OSTEOPATHIC MANIP,7-8 BODY REGN    Somatic dysfunction of thoracic region  -     OSTEOPATHIC MANIP,7-8 BODY REGN    Somatic dysfunction of lumbar region  -     OSTEOPATHIC MANIP,7-8 BODY REGN    Somatic dysfunction of pelvis region  -     OSTEOPATHIC MANIP,7-8 BODY REGN    Somatic dysfunction of lower extremity  -     OSTEOPATHIC MANIP,7-8 BODY REGN    Somatic dysfunction of upper extremity  -     OSTEOPATHIC MANIP,7-8 BODY REGN          Given that this has been interfering with the patient's quality of life and ADLs, after discussion, informed  consent, and medical assessment for safety, we have together decided to address this concern with Osteopathic Manipulative Treatment.    Please see OMT Procedure Note below for the specifics of treatment.         OMT PROCEDURE NOTE      Body Region: Head, Face, and/or Jaw  Somatic Dysfunction: tight OA R>L  Treatment: suboccipital release  Outcome: Improved    Body Region: C-spine / Neck  Somatic Dysfunction: minor paraspinal tightness b/l  Treatment: soft tissue  Outcome: Improved    Body Region: UE, shoulder  Somatic Dysfunction: tight trapezius R>L, tight muscles under b/l shoulder blades  Treatment: muscle energy, pressure point release, passive stretching  Outcome: Improved    Body Region: Thoracic spine  Somatic Dysfunction: tight paraspinal muscles R>L, mild  Treatment: soft tissue  Outcome: Improved    Body Region:  L-spine  Somatic Dysfunction: tight paraspinal muscles R>L. mild  Treatment: soft tissue  Outcome: Improved    Body Region: LE   Somatic Dysfunction: good quad ROM (no tightness)  Treatment: muscle energy to quads   Outcome: stable, no change (already flexible w/good ROM)    Body Region: Pelvis  Somatic Dysfunction: ASIS anterior on right, minimally tight hip extensors  Treatment: muscle energy to hip flexors & extensors  Outcome: ASIS level    The patient actively participated in OMT and was able to communicate both positive and negative feedback throughout. OMT completed without incident. Patient tolerated treatment well. Patient reported that ROM, function, and/or pain level were improved. Advised that pain is occasionally worse during the first 24 hours after treatment and that drinking more water and taking Tylenol or Ibuprofen often help. Patient to return in 2-4 week/s or as needed for repeat osteopathic assessment.     Discussed foam rolling.       Wing Petersen DO    Answers submitted by the patient for this visit:  General Questionnaire (Submitted on 8/9/2024)  Chief Complaint:  Chronic problems general questions HPI Form  What is the reason for your visit today? : Strained muscle tendon  How many servings of fruits and vegetables do you eat daily?: 2-3  On average, how many sweetened beverages do you drink each day (Examples: soda, juice, sweet tea, etc.  Do NOT count diet or artificially sweetened beverages)?: 0  How many minutes a day do you exercise enough to make your heart beat faster?: 20 to 29  How many days a week do you exercise enough to make your heart beat faster?: 4  How many days per week do you miss taking your medication?: 0    I spent a total of 25 minutes on the day of the visit.   Time spent by me doing chart review, history and exam, documentation and further activities per the note    Answers submitted by the patient for this visit:  General Questionnaire (Submitted on 9/12/2024)  Chief Complaint: Chronic problems general questions HPI Form  What is the reason for your visit today? : back/ shoulder strain  How many servings of fruits and vegetables do you eat daily?: 2-3  On average, how many sweetened beverages do you drink each day (Examples: soda, juice, sweet tea, etc.  Do NOT count diet or artificially sweetened beverages)?: 0  How many minutes a day do you exercise enough to make your heart beat faster?: 20 to 29  How many days a week do you exercise enough to make your heart beat faster?: 4  How many days per week do you miss taking your medication?: 0

## 2024-09-13 ENCOUNTER — OFFICE VISIT (OUTPATIENT)
Dept: FAMILY MEDICINE | Facility: CLINIC | Age: 34
End: 2024-09-13
Payer: COMMERCIAL

## 2024-09-13 DIAGNOSIS — M99.00 SOMATIC DYSFUNCTION OF HEAD REGION: ICD-10-CM

## 2024-09-13 DIAGNOSIS — M99.03 SOMATIC DYSFUNCTION OF LUMBAR REGION: ICD-10-CM

## 2024-09-13 DIAGNOSIS — M99.05 SOMATIC DYSFUNCTION OF PELVIS REGION: ICD-10-CM

## 2024-09-13 DIAGNOSIS — M99.01 SOMATIC DYSFUNCTION OF CERVICAL REGION: ICD-10-CM

## 2024-09-13 DIAGNOSIS — M54.6 CHRONIC BILATERAL THORACIC BACK PAIN: Primary | ICD-10-CM

## 2024-09-13 DIAGNOSIS — M99.07 SOMATIC DYSFUNCTION OF UPPER EXTREMITY: ICD-10-CM

## 2024-09-13 DIAGNOSIS — M99.02 SOMATIC DYSFUNCTION OF THORACIC REGION: ICD-10-CM

## 2024-09-13 DIAGNOSIS — G89.29 CHRONIC BILATERAL THORACIC BACK PAIN: Primary | ICD-10-CM

## 2024-09-13 DIAGNOSIS — M99.06 SOMATIC DYSFUNCTION OF LOWER EXTREMITY: ICD-10-CM

## 2024-09-13 PROCEDURE — 99213 OFFICE O/P EST LOW 20 MIN: CPT | Mod: 25 | Performed by: STUDENT IN AN ORGANIZED HEALTH CARE EDUCATION/TRAINING PROGRAM

## 2024-09-13 PROCEDURE — 98928 OSTEOPATH MANJ 7-8 REGIONS: CPT | Performed by: STUDENT IN AN ORGANIZED HEALTH CARE EDUCATION/TRAINING PROGRAM

## 2025-06-23 SDOH — HEALTH STABILITY: PHYSICAL HEALTH: ON AVERAGE, HOW MANY DAYS PER WEEK DO YOU ENGAGE IN MODERATE TO STRENUOUS EXERCISE (LIKE A BRISK WALK)?: 3 DAYS

## 2025-06-23 SDOH — HEALTH STABILITY: PHYSICAL HEALTH: ON AVERAGE, HOW MANY MINUTES DO YOU ENGAGE IN EXERCISE AT THIS LEVEL?: 50 MIN

## 2025-06-23 ASSESSMENT — SOCIAL DETERMINANTS OF HEALTH (SDOH): HOW OFTEN DO YOU GET TOGETHER WITH FRIENDS OR RELATIVES?: ONCE A WEEK

## 2025-06-24 ENCOUNTER — OFFICE VISIT (OUTPATIENT)
Dept: FAMILY MEDICINE | Facility: CLINIC | Age: 35
End: 2025-06-24
Attending: INTERNAL MEDICINE
Payer: COMMERCIAL

## 2025-06-24 VITALS
DIASTOLIC BLOOD PRESSURE: 81 MMHG | WEIGHT: 170.6 LBS | HEIGHT: 71 IN | TEMPERATURE: 97.4 F | SYSTOLIC BLOOD PRESSURE: 129 MMHG | RESPIRATION RATE: 18 BRPM | BODY MASS INDEX: 23.88 KG/M2 | OXYGEN SATURATION: 98 % | HEART RATE: 72 BPM

## 2025-06-24 DIAGNOSIS — D22.9 MULTIPLE MELANOCYTIC NEVI: ICD-10-CM

## 2025-06-24 DIAGNOSIS — Z13.1 SCREENING FOR DIABETES MELLITUS: ICD-10-CM

## 2025-06-24 DIAGNOSIS — R55 VASOVAGAL SYNCOPE: ICD-10-CM

## 2025-06-24 DIAGNOSIS — G89.29 CHRONIC BILATERAL THORACIC BACK PAIN: ICD-10-CM

## 2025-06-24 DIAGNOSIS — Z00.00 ROUTINE GENERAL MEDICAL EXAMINATION AT A HEALTH CARE FACILITY: ICD-10-CM

## 2025-06-24 DIAGNOSIS — M54.6 CHRONIC BILATERAL THORACIC BACK PAIN: ICD-10-CM

## 2025-06-24 PROBLEM — Z12.83 SKIN CANCER SCREENING: Status: RESOLVED | Noted: 2023-06-20 | Resolved: 2025-06-24

## 2025-06-24 LAB
ALBUMIN SERPL BCG-MCNC: 4.5 G/DL (ref 3.5–5.2)
ALP SERPL-CCNC: 83 U/L (ref 40–150)
ALT SERPL W P-5'-P-CCNC: 32 U/L (ref 0–70)
ANION GAP SERPL CALCULATED.3IONS-SCNC: 8 MMOL/L (ref 7–15)
AST SERPL W P-5'-P-CCNC: 30 U/L (ref 0–45)
BASOPHILS # BLD AUTO: 0 10E3/UL (ref 0–0.2)
BASOPHILS NFR BLD AUTO: 1 %
BILIRUB SERPL-MCNC: 0.6 MG/DL
BUN SERPL-MCNC: 15.2 MG/DL (ref 6–20)
CALCIUM SERPL-MCNC: 9.4 MG/DL (ref 8.8–10.4)
CHLORIDE SERPL-SCNC: 103 MMOL/L (ref 98–107)
CHOLEST SERPL-MCNC: 169 MG/DL
CREAT SERPL-MCNC: 0.88 MG/DL (ref 0.67–1.17)
EGFRCR SERPLBLD CKD-EPI 2021: >90 ML/MIN/1.73M2
EOSINOPHIL # BLD AUTO: 0.2 10E3/UL (ref 0–0.7)
EOSINOPHIL NFR BLD AUTO: 4 %
ERYTHROCYTE [DISTWIDTH] IN BLOOD BY AUTOMATED COUNT: 11.7 % (ref 10–15)
FASTING STATUS PATIENT QL REPORTED: NO
FASTING STATUS PATIENT QL REPORTED: NO
GLUCOSE SERPL-MCNC: 92 MG/DL (ref 70–99)
HCO3 SERPL-SCNC: 28 MMOL/L (ref 22–29)
HCT VFR BLD AUTO: 43.1 % (ref 40–53)
HDLC SERPL-MCNC: 69 MG/DL
HGB BLD-MCNC: 14.9 G/DL (ref 13.3–17.7)
IMM GRANULOCYTES # BLD: 0 10E3/UL
IMM GRANULOCYTES NFR BLD: 0 %
LDLC SERPL CALC-MCNC: 90 MG/DL
LYMPHOCYTES # BLD AUTO: 1.9 10E3/UL (ref 0.8–5.3)
LYMPHOCYTES NFR BLD AUTO: 36 %
MCH RBC QN AUTO: 29.5 PG (ref 26.5–33)
MCHC RBC AUTO-ENTMCNC: 34.6 G/DL (ref 31.5–36.5)
MCV RBC AUTO: 85 FL (ref 78–100)
MONOCYTES # BLD AUTO: 0.5 10E3/UL (ref 0–1.3)
MONOCYTES NFR BLD AUTO: 8 %
NEUTROPHILS # BLD AUTO: 2.8 10E3/UL (ref 1.6–8.3)
NEUTROPHILS NFR BLD AUTO: 52 %
NONHDLC SERPL-MCNC: 100 MG/DL
PLATELET # BLD AUTO: 225 10E3/UL (ref 150–450)
POTASSIUM SERPL-SCNC: 4.9 MMOL/L (ref 3.4–5.3)
PROT SERPL-MCNC: 6.7 G/DL (ref 6.4–8.3)
RBC # BLD AUTO: 5.05 10E6/UL (ref 4.4–5.9)
SODIUM SERPL-SCNC: 139 MMOL/L (ref 135–145)
TRIGL SERPL-MCNC: 48 MG/DL
WBC # BLD AUTO: 5.5 10E3/UL (ref 4–11)

## 2025-06-24 PROCEDURE — 80061 LIPID PANEL: CPT | Performed by: INTERNAL MEDICINE

## 2025-06-24 PROCEDURE — 3074F SYST BP LT 130 MM HG: CPT | Performed by: INTERNAL MEDICINE

## 2025-06-24 PROCEDURE — 80053 COMPREHEN METABOLIC PANEL: CPT | Performed by: INTERNAL MEDICINE

## 2025-06-24 PROCEDURE — 36415 COLL VENOUS BLD VENIPUNCTURE: CPT | Performed by: INTERNAL MEDICINE

## 2025-06-24 PROCEDURE — 99395 PREV VISIT EST AGE 18-39: CPT | Performed by: INTERNAL MEDICINE

## 2025-06-24 PROCEDURE — 85025 COMPLETE CBC W/AUTO DIFF WBC: CPT | Performed by: INTERNAL MEDICINE

## 2025-06-24 PROCEDURE — 99213 OFFICE O/P EST LOW 20 MIN: CPT | Mod: 25 | Performed by: INTERNAL MEDICINE

## 2025-06-24 PROCEDURE — 3079F DIAST BP 80-89 MM HG: CPT | Performed by: INTERNAL MEDICINE

## 2025-06-24 PROCEDURE — 1126F AMNT PAIN NOTED NONE PRSNT: CPT | Performed by: INTERNAL MEDICINE

## 2025-06-24 ASSESSMENT — PAIN SCALES - GENERAL: PAINLEVEL_OUTOF10: NO PAIN (0)

## 2025-06-24 NOTE — PATIENT INSTRUCTIONS
Patient Education   Preventive Care Advice   This is general advice given by our system to help you stay healthy. However, your care team may have specific advice just for you. Please talk to your care team about your preventive care needs.  Nutrition  Eat 5 or more servings of fruits and vegetables each day.  Try wheat bread, brown rice and whole grain pasta (instead of white bread, rice, and pasta).  Get enough calcium and vitamin D. Check the label on foods and aim for 100% of the RDA (recommended daily allowance).  Lifestyle  Exercise at least 150 minutes each week  (30 minutes a day, 5 days a week).  Do muscle strengthening activities 2 days a week. These help control your weight and prevent disease.  No smoking.  Wear sunscreen to prevent skin cancer.  Have a dental exam and cleaning every 6 months.  Yearly exams  See your health care team every year to talk about:  Any changes in your health.  Any medicines your care team has prescribed.  Preventive care, family planning, and ways to prevent chronic diseases.  Shots (vaccines)   HPV shots (up to age 26), if you've never had them before.  Hepatitis B shots (up to age 59), if you've never had them before.  COVID-19 shot: Get this shot when it's due.  Flu shot: Get a flu shot every year.  Tetanus shot: Get a tetanus shot every 10 years.  Pneumococcal, hepatitis A, and RSV shots: Ask your care team if you need these based on your risk.  Shingles shot (for age 50 and up)  General health tests  Diabetes screening:  Starting at age 35, Get screened for diabetes at least every 3 years.  If you are younger than age 35, ask your care team if you should be screened for diabetes.  Cholesterol test: At age 39, start having a cholesterol test every 5 years, or more often if advised.  Bone density scan (DEXA): At age 50, ask your care team if you should have this scan for osteoporosis (brittle bones).  Hepatitis C: Get tested at least once in your life.  STIs (sexually  transmitted infections)  Before age 24: Ask your care team if you should be screened for STIs.  After age 24: Get screened for STIs if you're at risk. You are at risk for STIs (including HIV) if:  You are sexually active with more than one person.  You don't use condoms every time.  You or a partner was diagnosed with a sexually transmitted infection.  If you are at risk for HIV, ask about PrEP medicine to prevent HIV.  Get tested for HIV at least once in your life, whether you are at risk for HIV or not.  Cancer screening tests  Cervical cancer screening: If you have a cervix, begin getting regular cervical cancer screening tests starting at age 21.  Breast cancer scan (mammogram): If you've ever had breasts, begin having regular mammograms starting at age 40. This is a scan to check for breast cancer.  Colon cancer screening: It is important to start screening for colon cancer at age 45.  Have a colonoscopy test every 10 years (or more often if you're at risk) Or, ask your provider about stool tests like a FIT test every year or Cologuard test every 3 years.  To learn more about your testing options, visit:   .  For help making a decision, visit:   https://bit.ly/rj32229.  Prostate cancer screening test: If you have a prostate, ask your care team if a prostate cancer screening test (PSA) at age 55 is right for you.  Lung cancer screening: If you are a current or former smoker ages 50 to 80, ask your care team if ongoing lung cancer screenings are right for you.  For informational purposes only. Not to replace the advice of your health care provider. Copyright   2023 [employer]. All rights reserved. Clinically reviewed by the [facility] Transitions Program. MyCube [id number] - REV 01/24.

## 2025-06-24 NOTE — PROGRESS NOTES
Preventive Care Visit  [facility]  Adrianesommer Chawla DO, Internal Medicine  Jun 24, 2025      Assessment & Plan     (Z00.00) Routine general medical examination at a health care facility  Comment:   Plan: Comprehensive metabolic panel (BMP + Alb, Alk         Phos, ALT, AST, Total. Bili, TP), Lipid panel         reflex to direct LDL Non-fasting  Immunizations: UTD  Labs: Lipids, Diabetes screen   Discussed healthy lifestyle and aging recommendations including regular exercise, 5+ fruits and veggies daily.    (Z13.1) Screening for diabetes mellitus  Comment: Today    (R55) Vasovagal syncope during vaccinations  Comment: Check labs today, use recumbent chair for vaccines.  Plan: CBC with platelets and differential    (D22.9) Multiple melanocytic nevi  Comment: Rec seeing Derm [id number].  Plan: Adult Dermatology  Referral    (M54.6,  G89.29) Chronic bilateral thoracic back pain  Comment: Recommend PT for strength training- already seeing chiropractor.  Plan: Physical Therapy  Referral       Patient has been advised of split billing requirements and indicates understanding: Yes      Reviewed preventive health counseling, as reflected in patient instructions  Counseling  Appropriate preventive services were addressed with this patient via screening, questionnaire, or discussion as appropriate for fall prevention, nutrition, physical activity, Tobacco-use cessation, social engagement, weight loss and cognition.  Checklist reviewing preventive services available has been given to the patient.  Reviewed patient's diet, addressing concerns and/or questions.   He is at risk for lack of exercise and has been provided with information to increase physical activity for the benefit of his well-being.   The patient was instructed to see the dentist every 6 months.         Paulette Ludwig is a 35 year old, presenting for the following:  Physical        6/24/2025     8:41 AM    Additional Questions   Roomed by Nirali RUIZ          HPI  Has had fainting episodes with vaccines.  Happened with 3 of the COVID vaccines.    Last saw 6/24/24- chest pain, low back pain, melanocytic nevi- sees derm    Going to see a chiropractor and they took x-rays and saw some curvature of the spine with possible mild scoliosis.  Back pain seems better with this.           Advance Care Planning    Discussed advance care planning with patient; informed AVS has link to [employer] [employer].        6/23/2025   General Health   How would you rate your overall physical health? Good   Feel stress (tense, anxious, or unable to sleep) Only a little   (!) STRESS CONCERN      6/23/2025   Nutrition   Three or more servings of calcium each day? Yes   Diet: Vegetarian/vegan   How many servings of fruit and vegetables per day? (!) 2-3   How many sweetened beverages each day? 0-1         6/23/2025   Exercise   Days per week of moderate/strenous exercise 3 days   Average minutes spent exercising at this level 50 min         6/23/2025   Social Factors   Frequency of gathering with friends or relatives Once a week   Worry food won't last until get money to buy more No   Food not last or not have enough money for food? No   Do you have housing? (Housing is defined as stable permanent housing and does not include staying outside in a car, in a tent, in an abandoned building, in an overnight shelter, or couch-surfing.) No   Are you worried about losing your housing? No   Lack of transportation? No   Unable to get utilities (heat,electricity)? No   Want help with housing or utility concern? No   (!) HOUSING CONCERN PRESENT      6/23/2025   Dental   Dentist two times every year? (!) NO         Today's PHQ-2 Score:       6/23/2025    11:21 PM   PHQ-2 ( 1999 [employer])   Q1: Little interest or pleasure in doing things 0   Q2: Feeling down, depressed or hopeless 0   PHQ-2 Score 0    Q1: Little interest or pleasure in doing things Not at all   Q2:  "Feeling down, depressed or hopeless Not at all   PHQ-2 Score 0       Patient-reported           6/23/2025   Substance Use   Alcohol more than 3/day or more than 7/wk No   Do you use any other substances recreationally? No     Social History     Tobacco Use    Smoking status: Never    Smokeless tobacco: Never   Vaping Use    Vaping status: Never Used   Substance Use Topics    Alcohol use: Yes     Comment: 3-4 beers per week    Drug use: No           6/23/2025   STI Screening   New sexual partner(s) since last STI/HIV test? No         6/23/2025   Contraception/Family Planning   Questions about contraception or family planning No        Reviewed and updated as needed this visit by Provider     Meds                Past Medical History:   Diagnosis Date    Closed fracture of unspecified part of humerus 03/01/2005    Left distal Humerus fx.    Radius fracture, right 01/01/1995    Tibia fracture, right 01/01/1999     Past Surgical History:   Procedure Laterality Date    SURGICAL HISTORY OF -   03/2005    ORIF Left Humerus Fx.  [facility]         Lab work is in process         Objective    Exam  /81 (BP Location: Right arm, Patient Position: Sitting, Cuff Size: Adult Regular)   Pulse 72   Temp 97.4  F (36.3  C) (Temporal)   Resp 18   Ht 1.795 m (5' 10.67\")   Wt 77.4 kg (170 lb 9.6 oz)   SpO2 98%   BMI 24.02 kg/m     Estimated body mass index is 24.02 kg/m  as calculated from the following:    Height as of this encounter: 1.795 m (5' 10.67\").    Weight as of this encounter: 77.4 kg (170 lb 9.6 oz).    Physical Exam  GENERAL: alert and no distress  EYES: Eyes grossly normal to inspection, PERRL and conjunctivae and sclerae normal  HENT: ear canals and TM's normal, nose and mouth without ulcers or lesions  NECK: no adenopathy, no asymmetry, masses, or scars  RESP: lungs clear to auscultation - no rales, rhonchi or wheezes  CV: regular rate and rhythm, normal S1 S2, no S3 or S4, no murmur, click " or rub, no peripheral edema  MS: no gross musculoskeletal defects noted, no edema  SKIN: no suspicious lesions or rashes  NEURO: Normal strength and tone, mentation intact and speech normal  PSYCH: mentation appears normal, affect normal/bright        Signed Electronically by: Adriane Chawla,

## 2025-06-25 ENCOUNTER — PATIENT OUTREACH (OUTPATIENT)
Dept: CARE COORDINATION | Facility: CLINIC | Age: 35
End: 2025-06-25
Payer: COMMERCIAL

## 2025-06-26 ENCOUNTER — RESULTS FOLLOW-UP (OUTPATIENT)
Dept: FAMILY MEDICINE | Facility: CLINIC | Age: 35
End: 2025-06-26

## 2025-09-02 ENCOUNTER — TELEPHONE (OUTPATIENT)
Dept: FAMILY MEDICINE | Facility: CLINIC | Age: 35
End: 2025-09-02
Payer: COMMERCIAL